# Patient Record
Sex: MALE | Race: WHITE | NOT HISPANIC OR LATINO | Employment: FULL TIME | URBAN - NONMETROPOLITAN AREA
[De-identification: names, ages, dates, MRNs, and addresses within clinical notes are randomized per-mention and may not be internally consistent; named-entity substitution may affect disease eponyms.]

---

## 2017-02-06 ENCOUNTER — OFFICE VISIT (OUTPATIENT)
Dept: CHIROPRACTIC MEDICINE | Facility: OTHER | Age: 52
End: 2017-02-06
Attending: CHIROPRACTOR
Payer: COMMERCIAL

## 2017-02-06 DIAGNOSIS — M54.50 ACUTE BILATERAL LOW BACK PAIN WITHOUT SCIATICA: ICD-10-CM

## 2017-02-06 DIAGNOSIS — M99.02 SEGMENTAL AND SOMATIC DYSFUNCTION OF THORACIC REGION: ICD-10-CM

## 2017-02-06 DIAGNOSIS — M99.03 SEGMENTAL AND SOMATIC DYSFUNCTION OF LUMBAR REGION: Primary | ICD-10-CM

## 2017-02-06 PROCEDURE — 98940 CHIROPRACT MANJ 1-2 REGIONS: CPT | Performed by: CHIROPRACTOR

## 2017-02-07 NOTE — PROGRESS NOTES
Subjective Finding:    Chief compalint: Patient presents with:  Back Pain: stiffness   , Pain Scale: 6/10, Intensity: sharp, Duration: 2 days, Radiating: no.    Date of injury:     Activities that the pain restricts:   Home/household/hobbies/social activities: yes.  Work duties: no.  Sleep: no.  Makes symptoms better: rest.  Makes symptoms worse: activity and twisting.  Have you seen anyone else for the symptoms? No.  Work related: no.  Automobile related injury: no.    Objective and Assessment:    Posture Analysis:   High shoulder: .  Head tilt: .  High iliac crest: .  Head carriage: neutral.  Thoracic Kyphosis: neutral.  Lumbar Lordosis: forward.    Lumbar Range of Motion: extension decreased, left lateral flexion decreased and right lateral flexion decreased.  Cervical Range of Motion: .  Thoracic Range of Motion: .  Extremity Range of Motion: .    Palpation:   Lumbar paraspinals bilateral stiffness    Segmental dysfunction pre-treatment and treatment area: T9, L4 and L5.    Assessment post-treatment:  Cervical: .  Thoracic: ROM increased.  Lumbar: ROM increased and pain and tenderness decreased.    Comments: recovering from shoulder surgery.      Complicating Factors: .    Plan / Procedure:    Treatment plan: PRN.  Instructed patient: ice 20 minutes every other hour as needed.  Short term goals: increase ROM.  Long term goals: increase ADL.  Prognosis: excellent.

## 2017-07-17 DIAGNOSIS — M10.9 GOUT, UNSPECIFIED CAUSE, UNSPECIFIED CHRONICITY, UNSPECIFIED SITE: Primary | ICD-10-CM

## 2017-07-17 DIAGNOSIS — M10.9 ACUTE GOUTY ARTHRITIS: ICD-10-CM

## 2017-07-19 RX ORDER — FEBUXOSTAT 40 MG/1
TABLET ORAL
Qty: 30 TABLET | Refills: 0 | Status: SHIPPED | OUTPATIENT
Start: 2017-07-19 | End: 2017-09-18

## 2017-07-19 NOTE — TELEPHONE ENCOUNTER
[facility]  Last office visit: 8/23/16  Last refill: 8/23/16 #30, 11 R.  Last labs: 12/16/15.  Medication pended.  Please advise.  Thank you.

## 2017-08-01 ENCOUNTER — OFFICE VISIT (OUTPATIENT)
Dept: FAMILY MEDICINE | Facility: OTHER | Age: 52
End: 2017-08-01
Attending: FAMILY MEDICINE
Payer: COMMERCIAL

## 2017-08-01 ENCOUNTER — TELEPHONE (OUTPATIENT)
Dept: FAMILY MEDICINE | Facility: OTHER | Age: 52
End: 2017-08-01

## 2017-08-01 VITALS
BODY MASS INDEX: 26.99 KG/M2 | SYSTOLIC BLOOD PRESSURE: 120 MMHG | DIASTOLIC BLOOD PRESSURE: 78 MMHG | OXYGEN SATURATION: 95 % | TEMPERATURE: 98.1 F | WEIGHT: 192.8 LBS | HEART RATE: 72 BPM | HEIGHT: 71 IN

## 2017-08-01 DIAGNOSIS — H61.21 IMPACTED CERUMEN OF RIGHT EAR: Primary | ICD-10-CM

## 2017-08-01 PROCEDURE — 99213 OFFICE O/P EST LOW 20 MIN: CPT | Performed by: FAMILY MEDICINE

## 2017-08-01 ASSESSMENT — ANXIETY QUESTIONNAIRES
7. FEELING AFRAID AS IF SOMETHING AWFUL MIGHT HAPPEN: NOT AT ALL
3. WORRYING TOO MUCH ABOUT DIFFERENT THINGS: NOT AT ALL
6. BECOMING EASILY ANNOYED OR IRRITABLE: NOT AT ALL
GAD7 TOTAL SCORE: 0
4. TROUBLE RELAXING: NOT AT ALL
2. NOT BEING ABLE TO STOP OR CONTROL WORRYING: NOT AT ALL
5. BEING SO RESTLESS THAT IT IS HARD TO SIT STILL: NOT AT ALL
1. FEELING NERVOUS, ANXIOUS, OR ON EDGE: NOT AT ALL

## 2017-08-01 ASSESSMENT — PAIN SCALES - GENERAL: PAINLEVEL: NO PAIN (0)

## 2017-08-01 NOTE — PROGRESS NOTES
"  SUBJECTIVE:                                                    Patrick Hamlin is a 51 year old male who presents to clinic today for the following health issues:      Ear Problem      Duration: 1 week    Description (location/character/radiation): Green \"gunk\" coming from Right ear after swimming in pool.    Intensity:  mild    Accompanying signs and symptoms: muffled hearing. Can feel some pressure/moisture.     History (similar episodes/previous evaluation): None    Precipitating or alleviating factors: None    Therapies tried and outcome: None    Has been swimming more     Plugged and pressure - no real pain              Problem list and histories reviewed & adjusted, as indicated.  Additional history: as documented        Reviewed and updated as needed this visit by clinical staffTobacco  Allergies  Meds  Soc Hx      Reviewed and updated as needed this visit by Provider         ROS:  C: NEGATIVE for fever, chills, change in weight  E/M: NEGATIVE for ear, mouth and throat problems    OBJECTIVE:                                                    /78 (BP Location: Right arm, Patient Position: Chair, Cuff Size: Adult Regular)  Pulse 72  Temp 98.1  F (36.7  C) (Tympanic)  Ht 5' 11\" (1.803 m)  Wt 192 lb 12.8 oz (87.5 kg)  SpO2 95%  BMI 26.89 kg/m2  Body mass index is 26.89 kg/(m^2).   GENERAL: healthy, alert, well nourished, well hydrated, no distress  HENT: ear canals- normal; TMs- normal but 90% plugged in right ear ; Nose- normal; Mouth- no ulcers, no lesions  NECK: no tenderness, no adenopathy, no asymmetry, no masses, no stiffness; thyroid- normal to palpation         ASSESSMENT/PLAN:                                                      (H61.21) Impacted cerumen of right ear  (primary encounter diagnosis)  Comment: no s/s of Otitis externa   Plan: REMOVE IMPACTED CERUMEN        Ear wash done with good results and sx resolved. Symptomatic treatment was discussed along when patient should call and/or " come back into the clinic or go to ER/Urgent care. All questions answered.           Sabino Beckford MD  [facility]

## 2017-08-01 NOTE — MR AVS SNAPSHOT
"              After Visit Summary   8/1/2017    Patrick Hamlin    MRN: [id number]           Patient Information     Date Of Birth          1965        Visit Information        Provider Department      8/1/2017 3:30 PM Sabino Beckford MD [facility]        Today's Diagnoses     Impacted cerumen of right ear    -  1       Follow-ups after your visit        Who to contact     If you have questions or need follow up information about today's clinic visit or your schedule please contact [facility] directly at [phone].  Normal or non-critical lab and imaging results will be communicated to you by Trefishart, letter or phone within 4 business days after the clinic has received the results. If you do not hear from us within 7 days, please contact the clinic through hiket or phone. If you have a critical or abnormal lab result, we will notify you by phone as soon as possible.  Submit refill requests through Twones or call your pharmacy and they will forward the refill request to us. Please allow 3 business days for your refill to be completed.          Additional Information About Your Visit        MyChart Information     Twones gives you secure access to your electronic health record. If you see a primary care provider, you can also send messages to your care team and make appointments. If you have questions, please call your primary care clinic.  If you do not have a primary care provider, please call [phone] and they will assist you.        Care EveryWhere ID     This is your Care EveryWhere ID. This could be used by other organizations to access your [place] medical records  [id number]        Your Vitals Were     Pulse Temperature Height Pulse Oximetry BMI (Body Mass Index)       72 98.1  F (36.7  C) (Tympanic) 5' 11\" (1.803 m) 95% 26.89 kg/m2        Blood Pressure from Last 3 Encounters:   08/01/17 120/78   08/25/16 120/78   08/23/16 121/73    Weight from Last 3 Encounters: "   08/01/17 192 lb 12.8 oz (87.5 kg)   08/25/16 195 lb (88.5 kg)   08/23/16 195 lb (88.5 kg)              We Performed the Following     REMOVE IMPACTED CERUMEN        Primary Care Provider Office Phone # Fax #    Sabino Beckford [phone]       [facility] [address] [facility] [address]        Equal Access to Services     ANTHONY WISDOM : Hadii aad ku hadasho Soomaali, waaxda luqadaha, qaybta kaalmada adeegyada, waxay idiin hayaan adeeg kharash lalucila . So [facility] [phone].    ATENCIÓN: Si habla español, tiene a hudson disposición servicios gratuitos de asistencia lingüística. Llame al [phone].    We comply with applicable federal civil rights laws and [place] laws. We do not discriminate on the basis of race, color, national origin, age, disability sex, sexual orientation or gender identity.            Thank you!     Thank you for choosing [facility]  for your care. Our goal is always to provide you with excellent care. Hearing back from our patients is one way we can continue to improve our services. Please take a few minutes to complete the written survey that you may receive in the mail after your visit with us. Thank you!             Your Updated Medication List - Protect others around you: Learn how to safely use, store and throw away your medicines at www.disposemymeds.org.          This list is accurate as of: 8/1/17  4:11 PM.  Always use your most recent med list.                   Brand Name Dispense Instructions for use Diagnosis    ASPIRIN PO      Take 81 mg by mouth daily        famotidine 10 MG tablet    PEPCID     Take 10 mg by mouth daily        indomethacin 25 MG capsule    INDOCIN    42 capsule    TAKE 2 CAPSULES BY MOUTH 2 TIMES DAILY WITH MEALS    Gout       ULORIC 40 MG Tabs tablet   Generic drug:  febuxostat     30 tablet    TAKE 1 TABLET BY MOUTH DAILY    Gout, unspecified cause, unspecified chronicity, unspecified site

## 2017-08-01 NOTE — TELEPHONE ENCOUNTER
"11:32 AM    Reason for Call: OVERBOOK    Patient is having the following symptoms: green \"gunk\" coming from Right ear, poss infection    The patient is requesting an appointment for sooner than first avail with dr Beckford.    Was an appointment offered for this call? No    Preferred method for responding to this message: Telephone Call    If we cannot reach you directly, may we leave a detailed response at the number you provided? Yes    Can this message wait until your PCP/provider returns, if unavailable today? Not applicable, provider in    Clare Conde      "

## 2017-08-01 NOTE — NURSING NOTE
"Chief Complaint   Patient presents with     Ear Problem       Initial /78 (BP Location: Right arm, Patient Position: Chair, Cuff Size: Adult Regular)  Pulse 72  Temp 98.1  F (36.7  C) (Tympanic)  Ht 5' 11\" (1.803 m)  Wt 192 lb 12.8 oz (87.5 kg)  SpO2 95%  BMI 26.89 kg/m2 Estimated body mass index is 26.89 kg/(m^2) as calculated from the following:    Height as of this encounter: 5' 11\" (1.803 m).    Weight as of this encounter: 192 lb 12.8 oz (87.5 kg).  Medication Reconciliation: complete     Niki Chun     "

## 2017-08-02 ASSESSMENT — PATIENT HEALTH QUESTIONNAIRE - PHQ9: SUM OF ALL RESPONSES TO PHQ QUESTIONS 1-9: 0

## 2017-08-02 ASSESSMENT — ANXIETY QUESTIONNAIRES: GAD7 TOTAL SCORE: 0

## 2017-09-18 DIAGNOSIS — M10.9 GOUT, UNSPECIFIED CAUSE, UNSPECIFIED CHRONICITY, UNSPECIFIED SITE: ICD-10-CM

## 2017-09-18 RX ORDER — FEBUXOSTAT 40 MG/1
TABLET ORAL
Qty: 30 TABLET | Refills: 1 | Status: SHIPPED | OUTPATIENT
Start: 2017-09-18 | End: 2017-11-15

## 2017-09-18 NOTE — TELEPHONE ENCOUNTER
[facility]        Last Written Prescription Date: 7/19/2017  Last Fill Quantity: 30, # refills: 0  [facility] Office [facility] with [email or web address] or [facility] prescribing provider:  8/01/2017        Uric Acid   Date Value Ref Range Status   12/16/2015 6.7 3.5 - 7.2 mg/dL Final   ]  Creatinine   Date Value Ref Range Status   12/16/2015 1.15 0.66 - 1.25 mg/dL Final   ]  Lab Results   Component Value Date    WBC 6.0 04/01/2016     Lab Results   Component Value Date    RBC 5.19 04/01/2016     Lab Results   Component Value Date    HGB 15.9 04/01/2016     Lab Results   Component Value Date    HCT 46.5 04/01/2016     No components found for: MCT  Lab Results   Component Value Date    MCV 90 04/01/2016     Lab Results   Component Value Date    MCH 30.6 04/01/2016     Lab Results   Component Value Date    MCHC 34.2 04/01/2016     Lab Results   Component Value Date    RDW 12.8 04/01/2016     Lab Results   Component Value Date     04/01/2016     No results found for: AST  No results found for: ALT

## 2017-11-15 DIAGNOSIS — M10.9 GOUT, UNSPECIFIED CAUSE, UNSPECIFIED CHRONICITY, UNSPECIFIED SITE: ICD-10-CM

## 2017-11-16 NOTE — TELEPHONE ENCOUNTER
[facility]       Last Written Prescription Date: 9/18/2017  Last Fill Quantity: 30,  # refills: 1   Last Office Visit with [facility], [facility] or [facility] prescribing provider: 8/01/2017

## 2017-11-17 RX ORDER — FEBUXOSTAT 40 MG/1
TABLET ORAL
Qty: 30 TABLET | Refills: 5 | Status: SHIPPED | OUTPATIENT
Start: 2017-11-17 | End: 2018-04-16

## 2018-04-16 DIAGNOSIS — M10.9 GOUT, UNSPECIFIED CAUSE, UNSPECIFIED CHRONICITY, UNSPECIFIED SITE: ICD-10-CM

## 2018-04-17 RX ORDER — FEBUXOSTAT 40 MG/1
TABLET ORAL
Qty: 30 TABLET | Refills: 5 | Status: SHIPPED | OUTPATIENT
Start: 2018-04-17 | End: 2018-11-13

## 2018-04-17 NOTE — TELEPHONE ENCOUNTER
Please advise on [facility].  Patient due for labs.  Last office visit: 8/1/17  Last signed: 11/17/17 #30, 5 R  Thank you.

## 2018-11-29 NOTE — PATIENT INSTRUCTIONS
Preventive Health Recommendations  Male Ages 50 - 64    Yearly exam:             See your health care provider every year in order to  o   Review health changes.   o   Discuss preventive care.    o   Review your medicines if your doctor has prescribed any.     Have a cholesterol test every 5 years, or more frequently if you are at risk for high cholesterol/heart disease.     Have a diabetes test (fasting glucose) every three years. If you are at risk for diabetes, you should have this test more often.     Have a colonoscopy at age 50, or have a yearly FIT test (stool test). These exams will check for colon cancer.      Talk with your health care provider about whether or not a prostate cancer screening test (PSA) is right for you.    You should be tested each year for STDs (sexually transmitted diseases), if you re at risk.     Shots: Get a flu shot each year. Get a tetanus shot every 10 years.     Nutrition:    Eat at least 5 servings of fruits and vegetables daily.     Eat whole-grain bread, whole-wheat pasta and brown rice instead of white grains and rice.     Get adequate Calcium and Vitamin D.     Lifestyle    Exercise for at least 150 minutes a week (30 minutes a day, 5 days a week). This will help you control your weight and prevent disease.     Limit alcohol to one drink per day.     No smoking.     Wear sunscreen to prevent skin cancer.     See your dentist every six months for an exam and cleaning.     See your eye doctor every 1 to 2 years.   Lipid Profile (Chol, Trig, HDL, LDL calc)   Status:  Final result   Visible to patient:  Yes (MyChart) Dx:  FH: prostate cancer; FH: colon cancer... Order: [id number]       Notes Recorded by Sabino Beckford MD on 12/7/2018 at 8:49 AM  No notes recorded by provider        Ref Range & Units 7:51 AM       Cholesterol <200 mg/dL 219 (H)     Comments: Desirable:       <200 mg/dl     Triglycerides <150 mg/dL 208 (H)     Comments: Borderline high:  150-199 mg/dl   High:              200-499 mg/dl   Very high:       >499 mg/dl   Fasting specimen        HDL Cholesterol >39 mg/dL 43     LDL Cholesterol Calculated <100 mg/dL 134 (H)     Comments: Above desirable:  100-129 mg/dl   Borderline High:  130-159 mg/dL   High:             160-189 mg/dL   Very high:       >189 mg/dl        Non HDL Cholesterol <130 mg/dL 176 (H)     Comments: Above Desirable:  130-159 mg/dl   Borderline high:  160-189 mg/dl   High:             190-219 mg/dl   Very high:       >219 mg/dl        [facility]ing Agency  [facility]          Specimen Collected: 12/07/18  7:51 AM Last Resulted: 12/07/18  8:18 AM                                Comprehensive metabolic panel   Status:  Final result   Visible to patient:  Yes (MyChart) Dx:  FH: prostate cancer; FH: colon cancer... Order: [id number]       Notes Recorded by Sabino Beckford MD on 12/7/2018 at 8:49 AM  No notes recorded by provider           Ref Range & Units 7:51 AM   2yr ago        Sodium 133 - 144 mmol/L 142 142      Potassium 3.4 - 5.3 mmol/L 4.3 4.2      Chloride 94 - 109 mmol/L 108 108      Carbon Dioxide 20 - 32 mmol/L 26 27      Anion Gap 3 - 14 mmol/L 8 7      Glucose 70 - 99 mg/dL 110 (H) 87     Comments: Fasting specimen      Urea Nitrogen 7 - 30 mg/dL 18 19      Creatinine 0.66 - 1.25 mg/dL 1.03 1.15      GFR Estimate >60 mL/min/1.7m2 76 68CM     Comments: Non  GFR Calc      GFR Estimate If Black >60 mL/min/1.7m2 >90 82CM     Comments:  GFR Calc      Calcium 8.5 - 10.1 mg/dL 9.1 8.5      Bilirubin Total 0.2 - 1.3 mg/dL 0.9       Albumin 3.4 - 5.0 g/dL 4.0       Protein Total 6.8 - 8.8 g/dL 7.9       Alkaline Phosphatase 40 - 150 U/L 73       ALT 0 - 70 U/L 74 (H)       AST 0 - 45 U/L 30      [facility]          Specimen Collected: 12/07/18  7:51 AM Last Resulted: 12/07/18  8:18 AM                CM=Additional comments                      PSA Diagnostic   Status:  Final result   Visible to patient:  Yes (MyChart) Dx:   FH: prostate cancer; FH: colon cancer... Order: [id number]       Notes Recorded by Sabino Beckford MD on 12/7/2018 at 8:49 AM  No notes recorded by provider        Ref Range & Units 7:51 AM       PSA 0 - 4 ug/L 0.38     Comments: Assay Method:  Chemiluminescence using Siemens Vista analyzer     Resulting Agency  [facility]          Specimen Collected: 12/07/18  7:51 AM Last Resulted: 12/07/18  8:18 AM                                 CBC with platelets and differential   Status:  Final result   Visible to patient:  Yes (MyChart) Dx:  FH: prostate cancer; FH: colon cancer... Order: [id number]       Notes Recorded by Sabino Beckford MD on 12/7/2018 at 8:49 AM  No notes recorded by provider           Ref Range & Units 7:51 AM  (12/7/18) 2yr ago  (4/1/16) 2yr ago  (12/16/15)      WBC 4.0 - 11.0 10e9/L 5.5 6.0 9.4      RBC Count 4.4 - 5.9 10e12/L 5.43 5.19 4.97      Hemoglobin 13.3 - 17.7 g/dL 16.9 15.9 15.3      Hematocrit 40.0 - 53.0 % 48.1 46.5 44.2      MCV 78 - 100 fl 89 90 89      MCH 26.5 - 33.0 pg 31.1 30.6 30.8      MCHC 31.5 - 36.5 g/dL 35.1 34.2 34.6      RDW 10.0 - 15.0 % 12.3 12.8 13.0      Platelet Count 150 - 450 10e9/L 276 265 259      Diff Method  Automated Method Automated Method Automated Method      % Neutrophils % 51.7 51.8 70.4      % Lymphocytes % 29.7 29.3 16.4      % Monocytes % 10.8 12.7 9.8      % Eosinophils % 6.7 4.7 2.8      % Basophils % 0.7 1.0 0.4      % Immature Granulocytes % 0.4 0.5 0.2      Nucleated RBCs 0 /100 0 0       Absolute Neutrophil 1.6 - 8.3 10e9/L 2.9 3.1 6.6      Absolute Lymphocytes 0.8 - 5.3 10e9/L 1.6 1.8 1.5      Absolute Monocytes 0.0 - 1.3 10e9/L 0.6 0.8 0.9      Absolute Eosinophils 0.0 - 0.7 10e9/L 0.4 0.3 0.3      Absolute Basophils 0.0 - 0.2 10e9/L 0.0 0.1 0.0      Abs Immature Granulocytes 0 - 0.4 10e9/L 0.0 0.0 0.0      Absolute Nucleated RBC  0.0 0.0      [facility]          Specimen Collected: 12/07/18  7:51 AM Last Resulted: 12/07/18  7:58 AM                                  Status of Other Orders        Order    Lab Status Result Date Provider Status       EKG 12-lead complete w/read - Clinics No Result  Ordered
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
## 2018-12-07 ENCOUNTER — OFFICE VISIT (OUTPATIENT)
Dept: FAMILY MEDICINE | Facility: OTHER | Age: 53
End: 2018-12-07
Attending: FAMILY MEDICINE
Payer: COMMERCIAL

## 2018-12-07 VITALS
HEART RATE: 64 BPM | HEIGHT: 72 IN | TEMPERATURE: 97.3 F | SYSTOLIC BLOOD PRESSURE: 112 MMHG | OXYGEN SATURATION: 98 % | BODY MASS INDEX: 25.73 KG/M2 | WEIGHT: 190 LBS | DIASTOLIC BLOOD PRESSURE: 80 MMHG

## 2018-12-07 DIAGNOSIS — Z00.00 ROUTINE HISTORY AND PHYSICAL EXAMINATION OF ADULT: ICD-10-CM

## 2018-12-07 DIAGNOSIS — M10.9 GOUT, UNSPECIFIED CAUSE, UNSPECIFIED CHRONICITY, UNSPECIFIED SITE: ICD-10-CM

## 2018-12-07 DIAGNOSIS — Z80.42 FH: PROSTATE CANCER: ICD-10-CM

## 2018-12-07 DIAGNOSIS — L30.9 ECZEMA, UNSPECIFIED TYPE: ICD-10-CM

## 2018-12-07 DIAGNOSIS — R73.9 ELEVATED BLOOD SUGAR: ICD-10-CM

## 2018-12-07 DIAGNOSIS — Z00.00 ROUTINE HISTORY AND PHYSICAL EXAMINATION OF ADULT: Primary | ICD-10-CM

## 2018-12-07 DIAGNOSIS — Z80.0 FH: COLON CANCER: ICD-10-CM

## 2018-12-07 DIAGNOSIS — E78.2 MIXED HYPERLIPIDEMIA: ICD-10-CM

## 2018-12-07 LAB
ALBUMIN SERPL-MCNC: 4 G/DL (ref 3.4–5)
ALP SERPL-CCNC: 73 U/L (ref 40–150)
ALT SERPL W P-5'-P-CCNC: 74 U/L (ref 0–70)
ANION GAP SERPL CALCULATED.3IONS-SCNC: 8 MMOL/L (ref 3–14)
AST SERPL W P-5'-P-CCNC: 30 U/L (ref 0–45)
BASOPHILS # BLD AUTO: 0 10E9/L (ref 0–0.2)
BASOPHILS NFR BLD AUTO: 0.7 %
BILIRUB SERPL-MCNC: 0.9 MG/DL (ref 0.2–1.3)
BUN SERPL-MCNC: 18 MG/DL (ref 7–30)
CALCIUM SERPL-MCNC: 9.1 MG/DL (ref 8.5–10.1)
CHLORIDE SERPL-SCNC: 108 MMOL/L (ref 94–109)
CHOLEST SERPL-MCNC: 219 MG/DL
CO2 SERPL-SCNC: 26 MMOL/L (ref 20–32)
CREAT SERPL-MCNC: 1.03 MG/DL (ref 0.66–1.25)
DIFFERENTIAL METHOD BLD: NORMAL
EOSINOPHIL # BLD AUTO: 0.4 10E9/L (ref 0–0.7)
EOSINOPHIL NFR BLD AUTO: 6.7 %
ERYTHROCYTE [DISTWIDTH] IN BLOOD BY AUTOMATED COUNT: 12.3 % (ref 10–15)
GFR SERPL CREATININE-BSD FRML MDRD: 76 ML/MIN/1.7M2
GLUCOSE SERPL-MCNC: 110 MG/DL (ref 70–99)
HCT VFR BLD AUTO: 48.1 % (ref 40–53)
HDLC SERPL-MCNC: 43 MG/DL
HGB BLD-MCNC: 16.9 G/DL (ref 13.3–17.7)
IMM GRANULOCYTES # BLD: 0 10E9/L (ref 0–0.4)
IMM GRANULOCYTES NFR BLD: 0.4 %
LDLC SERPL CALC-MCNC: 134 MG/DL
LYMPHOCYTES # BLD AUTO: 1.6 10E9/L (ref 0.8–5.3)
LYMPHOCYTES NFR BLD AUTO: 29.7 %
MCH RBC QN AUTO: 31.1 PG (ref 26.5–33)
MCHC RBC AUTO-ENTMCNC: 35.1 G/DL (ref 31.5–36.5)
MCV RBC AUTO: 89 FL (ref 78–100)
MONOCYTES # BLD AUTO: 0.6 10E9/L (ref 0–1.3)
MONOCYTES NFR BLD AUTO: 10.8 %
NEUTROPHILS # BLD AUTO: 2.9 10E9/L (ref 1.6–8.3)
NEUTROPHILS NFR BLD AUTO: 51.7 %
NONHDLC SERPL-MCNC: 176 MG/DL
NRBC # BLD AUTO: 0 10*3/UL
NRBC BLD AUTO-RTO: 0 /100
PLATELET # BLD AUTO: 276 10E9/L (ref 150–450)
POTASSIUM SERPL-SCNC: 4.3 MMOL/L (ref 3.4–5.3)
PROT SERPL-MCNC: 7.9 G/DL (ref 6.8–8.8)
PSA SERPL-MCNC: 0.38 UG/L (ref 0–4)
RBC # BLD AUTO: 5.43 10E12/L (ref 4.4–5.9)
SODIUM SERPL-SCNC: 142 MMOL/L (ref 133–144)
TRIGL SERPL-MCNC: 208 MG/DL
WBC # BLD AUTO: 5.5 10E9/L (ref 4–11)

## 2018-12-07 PROCEDURE — 93000 ELECTROCARDIOGRAM COMPLETE: CPT | Performed by: INTERNAL MEDICINE

## 2018-12-07 PROCEDURE — 99396 PREV VISIT EST AGE 40-64: CPT | Performed by: FAMILY MEDICINE

## 2018-12-07 PROCEDURE — 85025 COMPLETE CBC W/AUTO DIFF WBC: CPT | Performed by: FAMILY MEDICINE

## 2018-12-07 PROCEDURE — 80061 LIPID PANEL: CPT | Performed by: FAMILY MEDICINE

## 2018-12-07 PROCEDURE — 84153 ASSAY OF PSA TOTAL: CPT | Performed by: FAMILY MEDICINE

## 2018-12-07 PROCEDURE — 80053 COMPREHEN METABOLIC PANEL: CPT | Performed by: FAMILY MEDICINE

## 2018-12-07 PROCEDURE — 36415 COLL VENOUS BLD VENIPUNCTURE: CPT | Performed by: FAMILY MEDICINE

## 2018-12-07 RX ORDER — FEBUXOSTAT 40 MG/1
40 TABLET, FILM COATED ORAL DAILY
Qty: 90 TABLET | Refills: 3 | Status: SHIPPED | OUTPATIENT
Start: 2018-12-07 | End: 2019-12-09

## 2018-12-07 ASSESSMENT — PAIN SCALES - GENERAL: PAINLEVEL: NO PAIN (0)

## 2018-12-07 ASSESSMENT — ANXIETY QUESTIONNAIRES
GAD7 TOTAL SCORE: 0
3. WORRYING TOO MUCH ABOUT DIFFERENT THINGS: NOT AT ALL
7. FEELING AFRAID AS IF SOMETHING AWFUL MIGHT HAPPEN: NOT AT ALL
2. NOT BEING ABLE TO STOP OR CONTROL WORRYING: NOT AT ALL
4. TROUBLE RELAXING: NOT AT ALL
5. BEING SO RESTLESS THAT IT IS HARD TO SIT STILL: NOT AT ALL
1. FEELING NERVOUS, ANXIOUS, OR ON EDGE: NOT AT ALL
6. BECOMING EASILY ANNOYED OR IRRITABLE: NOT AT ALL

## 2018-12-07 ASSESSMENT — PATIENT HEALTH QUESTIONNAIRE - PHQ9: SUM OF ALL RESPONSES TO PHQ QUESTIONS 1-9: 0

## 2018-12-07 NOTE — NURSING NOTE
Chief Complaint   Patient presents with     Physical       Initial /80  Pulse 64  Temp 97.3  F (36.3  C)  Ht 6' (1.829 m)  Wt 190 lb (86.2 kg)  SpO2 98%  BMI 25.77 kg/m2 Estimated body mass index is 25.77 kg/(m^2) as calculated from the following:    Height as of this encounter: 6' (1.829 m).    Weight as of this encounter: 190 lb (86.2 kg).  Medication Reconciliation: complete    Santos Julio LPN

## 2018-12-08 ASSESSMENT — ANXIETY QUESTIONNAIRES: GAD7 TOTAL SCORE: 0

## 2018-12-27 ENCOUNTER — TELEPHONE (OUTPATIENT)
Dept: SURGERY | Facility: OTHER | Age: 53
End: 2018-12-27

## 2018-12-27 DIAGNOSIS — Z12.11 SCREENING FOR MALIGNANT NEOPLASM OF COLON: Primary | ICD-10-CM

## 2018-12-27 NOTE — TELEPHONE ENCOUNTER
Referral received for meet and greet colonoscopy. Patient scheduled for colonoscopy on 2/8/19 with Dr. [place] at [facility] with Gatorade prep. Instructions given via phone and instructions mailed to patient with surgery handbook. .    Indiana Gale LPN

## 2018-12-27 NOTE — LETTER
December 27, 2018          Patrick Hamlin  [address]   [facility] [address]          Dear Patrick,     GUIDE TO YOUR COLONOSCOPY WITH GATORADE PREP  At [facility], we want to make sure that your procedure is as pleasant as possible. This guide is designed to answer any questions you might have and to walk you through the preparations you will need to make before your procedure. Should you have additional questions, please feel free to contact us. Contact numbers are listed below. Thank you for choosing [facility].    Clinic Health Unit Coordinator: [phone]  Clinic Nurse: [phone]  Surgery Education Nurse: [phone]    Date of Procedure: 2/8/19 with Dr. Canada   Admit time: Surgery Department will call you the day before your procedure by 5pm with your arrival time. If your surgery is on Monday, expect a call on Friday. If we were unable to reach you before 5PM, you may call admitting at [phone].    All questions or concerns can be directed to the clinic or surgery education nurse. If you have a scheduling or appointment question, or need to postpone your procedure, please call the Health Unit Coordinator between 8am and 4pm Monday through Friday. After hours or on weekends, please call [phone] to postpone.     Call the surgery nurse or your primary care provider if you should become ill within 1-2 weeks of your procedure and we will reschedule it when you are healthy. This includes signs or symptoms of a cold or the flu, fever, chills, sore throat, cough, chest congestion, productive cough, runny nose.     COLONOSCOPY PREP    7 DAYS BEFORE THE EXAM:  Do not take Aspirin (325mg) or other NSAIDS (Ibuprofen, Motrin, Aleve, Celebrex, Naproxen, etc) 7 days before your surgery. Tylenol is fine. Stop taking fiber supplements, vitamins, and iron. Stop eating corn, nuts and seeds.  If you are prescribed blood thinners (Aspirin, Coumadin/Warfarin, Plavix, etc) talk to your provider.  "If you are a diabetic and take medications to control your blood sugar, follow the special instructions listed or talk to your provider.     Please call the Surgery Education Nurse at [phone] 1-2 weeks before your procedure and have an allergy and medication list ready     Arrange transportation with a family member or friend to drive you home and have an adult available to stay with you for the next 4 hours when you arrive home for your safety. If you need to take a taxi or the bus, you MUST have a responsible adult to ride with you OR YOUR PROCEDURE WILL BE CANCELLED.    You have been ordered Gatorade Prep as your mechanical bowel prep. This are all over the counter and does not require a prescription. Please purchase the following items:    Two 5 mg Dulcolax (bisacodyl) tablets   One 8.3 ounce bottle of miralax   One 10 ounce bottle of magnesium citrate   One 64 ounce bottle of gatorade-No red or purple, not powdered.    2 DAYS BEFORE THE EXAM:   Begin a low fiber diet. No raw fruits and vegetables or whole grains.  Please see the list of foods you can have and foods to avoid on page 3 of the separate \"Miralax, Dulcolax and Magnesium Citrate\" colonoscopy instruction packet. Drink at least 4-6 large glasses of sports drink each day (not red or purple).    1 DAY BEFORE THE EXAM:  DO NOT EAT ANY SOLID FOOD OR MILK PRODUCTS AFTER 12:00 AM (MIDNIGHT). Drink only clear liquids all day, at least 8-10 glasses. Please see the list of liquids you can have and what to avoid on page 2 of the separate \"Miralax, Dulcolax and Magnesium Citrate\" colonoscopy instruction packet.  No red or purple colors, milk products, or alcohol.     AT 12:00 PM NOON THE DAY BEFORE EXAM:  Take 2 Dulcolax tablets by mouth with clear liquids.    AT 6:00 PM THE DAY BEFORE EXAM:  Mix the bottle of Miralax and 64 ounces of Gatorade in a pitcher. Drink one 8 ounce glass every 15 minutes until gone. Stay near a toilet. You will have watery stools " "and may have cramping, bloating and nausea.    DAY OF COLONOSCOPY EXAM:   6 HOURS PRIOR TO EXAM DAY OF PROCEDURE:  Drink the bottle of magnesium citrate. Right after drinking this, drink one full glass of water.    You many have clear liquids up until 3 hours before you check in at admitting.  If you need to take any medications after this, take them with a tiny sip of water. If you have asthma, bring your inhaler with you.    Wear comfortable clothes. No jewelry, body piercings, make-up, nail polish, hair spray, lotions, perfumes or colognes. Shower before you arrive. [place] in Admitting through the [place] Entr[facility]. You must have a  with you and and adult available to stay with your for 4 hours at home. The medicine used in this test will make you sleepy. If you do not have someone, please reschedule or your test will be cancelled.  It is recommended that you do not drive for 24 hours after your test. Do not operate power equipment, drink alcohol, make important decisions or sign legal documents.     COLONOSCOPY FREQUENTLY ASKED QUESTIONS  What is a colonoscopy?    A colonoscopy is a test to look at the lining of your large intestine. The purpose of the exam is to check for abnormalities including growths called \"polyps\" that can lead to serious disease. A flexible scope is inserted into your rectum by the doctor to examine your large intestine.    What are polyps?  Polyps are abnormal growths on the lining of the colon. Most polyps are not cancerous, but some polyps have the potential to turn into cancer with time. Polyps can also bleed. For these reasons, most polyps are removed during a colonoscopy and sent to the laboratory for microscopic examination.    What preparation is needed?  The colon must be completely clean for the procedure to be performed. You may be given one or two different prep solutions to cleanse your bowel. You will also need to follow a clear liquid diet the day before your " procedure.    What happens after the procedure?  After your procedure is complete, you will be taken back to your day surgery room where you will be monitored for approximately 1 hour. You can expect to feel drowsy for several hours afterward. You may experience some cramping or bloating due to the air introduced into your colon during the exam. You will not be able to drive or operate machinery the rest of the day. You will be given written discharge instructions and appropriate learning material before you go home. You must have an adult to stay at home with you for the next 4 hours after you leave the hospital for your safety.    When will I find out the results of my test?  Your surgeon will talk to you and your designated  before you leave and usually the preliminary results can be given to you at that time. If a biopsy was taken during your procedure, it will be sent to the laboratory for examination. Results usually take one week. You will be contacted by phone or by letter with results.      TIPS FOR COLON CLEANSING BEFORE YOUR COLONOSCOPY  To get accurate results from your exam, your colon must be completely clean and empty. Please follow your doctor's instructions. If you do not, you may need to repeat both the exam and colon-cleansing process.    The medicine you take may cause bloating, nausea and other discomfort. Follow these tips to make the process as easy as possible:     You may use alcohol-free baby wipes to ease anal irritation. You may also use Vaseline to help protect the skin. Other options include Tucks wipes, hemorrhoid treatments and hydrocortisone cream.     You may wish to squeeze some lemon juice into your preparation or add a packet of Crystal Light lemon-lime or ice tea flavor. (remember to not use red or purple)    To chill the solution, put it in your refrigerator or set it in a bowl of ice. Do not add ice in your glass. You may remove the preparation from the refrigerator  15-30 minutes before drinking.    Quickly drink one whole glass every 5 to 10 minutes. It may help to use a timer. If the liquid is too salty, use a straw.    Stay near a toilet! You will have diarrhea (loose watery stools) and may also have chills. Dress for comfort. Expect to feel discomfort until the stool clears from your colon. This usually takes about 2 to 4 hours.    Even when you are sitting on the toilet, keep drinking a glass of solution every 10-15 minutes. If you have nausea or vomiting, rinse your mouth with water. Take a break for 15 to 30 minutes, and then keep drinking the solution.    Some people find it helpful to suck on a wedge of lemon or lime. You may also try sucking on hard candy (not red or purple) or washing your mouth out with water, clear soda or mouthwash.    If you followed your doctor's orders and your stool is a clear or yellow liquid, you are ready for the exam. If you are not sure if your colon is clean, please call your clinic and ask to speak to a nurse.     Thank you for allowing Dr. Canada and our surgical team to participate in your care. Please review the following instructions and information to prepare for your upcoming colonoscopy and feel free to call our office with any questions.       Sincerely,        Lucas Canada MD

## 2019-02-06 DIAGNOSIS — Z12.11 SPECIAL SCREENING FOR MALIGNANT NEOPLASMS, COLON: Primary | ICD-10-CM

## 2019-02-06 RX ORDER — SODIUM, POTASSIUM,MAG SULFATES 17.5-3.13G
2 SOLUTION, RECONSTITUTED, ORAL ORAL ONCE
Qty: 2 BOTTLE | Refills: 0 | Status: SHIPPED | OUTPATIENT
Start: 2019-02-06 | End: 2019-05-06

## 2019-02-07 ENCOUNTER — ANESTHESIA EVENT (OUTPATIENT)
Dept: SURGERY | Facility: HOSPITAL | Age: 54
End: 2019-02-07
Payer: COMMERCIAL
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
## 2019-02-08 ENCOUNTER — ANESTHESIA (OUTPATIENT)
Dept: SURGERY | Facility: HOSPITAL | Age: 54
End: 2019-02-08
Payer: COMMERCIAL

## 2019-02-08 ENCOUNTER — HOSPITAL ENCOUNTER (OUTPATIENT)
Facility: HOSPITAL | Age: 54
Discharge: HOME | End: 2019-02-08
Attending: SURGERY | Admitting: SURGERY
Payer: COMMERCIAL

## 2019-02-08 VITALS
DIASTOLIC BLOOD PRESSURE: 94 MMHG | TEMPERATURE: 96.8 F | RESPIRATION RATE: 16 BRPM | BODY MASS INDEX: 26.14 KG/M2 | OXYGEN SATURATION: 96 % | WEIGHT: 193 LBS | HEIGHT: 72 IN | SYSTOLIC BLOOD PRESSURE: 122 MMHG

## 2019-02-08 PROCEDURE — 36000050 ZZH SURGERY LEVEL 2 1ST 30 MIN: Performed by: SURGERY

## 2019-02-08 PROCEDURE — 45378 DIAGNOSTIC COLONOSCOPY: CPT | Performed by: ANESTHESIOLOGY

## 2019-02-08 PROCEDURE — 71000027 ZZH RECOVERY PHASE 2 EACH 15 MINS: Performed by: SURGERY

## 2019-02-08 PROCEDURE — 01999 UNLISTED ANES PROCEDURE: CPT | Performed by: NURSE ANESTHETIST, CERTIFIED REGISTERED

## 2019-02-08 PROCEDURE — 40000305 ZZH STATISTIC PRE PROC ASSESS I: Performed by: SURGERY

## 2019-02-08 PROCEDURE — 37000008 ZZH ANESTHESIA TECHNICAL FEE, 1ST 30 MIN: Performed by: SURGERY

## 2019-02-08 PROCEDURE — G0105 COLORECTAL SCRN; HI RISK IND: HCPCS | Performed by: SURGERY

## 2019-02-08 PROCEDURE — 25000128 H RX IP 250 OP 636: Performed by: NURSE ANESTHETIST, CERTIFIED REGISTERED

## 2019-02-08 RX ORDER — ONDANSETRON 4 MG/1
4 TABLET, ORALLY DISINTEGRATING ORAL EVERY 30 MIN PRN
Status: DISCONTINUED | OUTPATIENT
Start: 2019-02-08 | End: 2019-02-08 | Stop reason: HOSPADM

## 2019-02-08 RX ORDER — MEPERIDINE HYDROCHLORIDE 50 MG/ML
12.5 INJECTION INTRAMUSCULAR; INTRAVENOUS; SUBCUTANEOUS
Status: DISCONTINUED | OUTPATIENT
Start: 2019-02-08 | End: 2019-02-08 | Stop reason: HOSPADM

## 2019-02-08 RX ORDER — HYDROMORPHONE HYDROCHLORIDE 1 MG/ML
.3-.5 INJECTION, SOLUTION INTRAMUSCULAR; INTRAVENOUS; SUBCUTANEOUS EVERY 10 MIN PRN
Status: DISCONTINUED | OUTPATIENT
Start: 2019-02-08 | End: 2019-02-08 | Stop reason: HOSPADM

## 2019-02-08 RX ORDER — NALOXONE HYDROCHLORIDE 0.4 MG/ML
.1-.4 INJECTION, SOLUTION INTRAMUSCULAR; INTRAVENOUS; SUBCUTANEOUS
Status: DISCONTINUED | OUTPATIENT
Start: 2019-02-08 | End: 2019-02-08 | Stop reason: HOSPADM

## 2019-02-08 RX ORDER — ALBUTEROL SULFATE 0.83 MG/ML
2.5 SOLUTION RESPIRATORY (INHALATION) EVERY 4 HOURS PRN
Status: DISCONTINUED | OUTPATIENT
Start: 2019-02-08 | End: 2019-02-08 | Stop reason: HOSPADM

## 2019-02-08 RX ORDER — ONDANSETRON 2 MG/ML
4 INJECTION INTRAMUSCULAR; INTRAVENOUS EVERY 30 MIN PRN
Status: DISCONTINUED | OUTPATIENT
Start: 2019-02-08 | End: 2019-02-08 | Stop reason: HOSPADM

## 2019-02-08 RX ORDER — LIDOCAINE 40 MG/G
CREAM TOPICAL
Status: DISCONTINUED | OUTPATIENT
Start: 2019-02-08 | End: 2019-02-08 | Stop reason: HOSPADM

## 2019-02-08 RX ORDER — SODIUM CHLORIDE, SODIUM LACTATE, POTASSIUM CHLORIDE, CALCIUM CHLORIDE 600; 310; 30; 20 MG/100ML; MG/100ML; MG/100ML; MG/100ML
INJECTION, SOLUTION INTRAVENOUS CONTINUOUS
Status: DISCONTINUED | OUTPATIENT
Start: 2019-02-08 | End: 2019-02-08 | Stop reason: HOSPADM

## 2019-02-08 RX ORDER — FENTANYL CITRATE 50 UG/ML
25-50 INJECTION, SOLUTION INTRAMUSCULAR; INTRAVENOUS
Status: DISCONTINUED | OUTPATIENT
Start: 2019-02-08 | End: 2019-02-08 | Stop reason: HOSPADM

## 2019-02-08 RX ORDER — HYDRALAZINE HYDROCHLORIDE 20 MG/ML
2.5-5 INJECTION INTRAMUSCULAR; INTRAVENOUS EVERY 10 MIN PRN
Status: DISCONTINUED | OUTPATIENT
Start: 2019-02-08 | End: 2019-02-08 | Stop reason: HOSPADM

## 2019-02-08 RX ORDER — PROPOFOL 10 MG/ML
INJECTION, EMULSION INTRAVENOUS PRN
Status: DISCONTINUED | OUTPATIENT
Start: 2019-02-08 | End: 2019-02-08

## 2019-02-08 RX ADMIN — PROPOFOL 40 MG: 10 INJECTION, EMULSION INTRAVENOUS at 07:48

## 2019-02-08 RX ADMIN — PROPOFOL 40 MG: 10 INJECTION, EMULSION INTRAVENOUS at 07:42

## 2019-02-08 RX ADMIN — PROPOFOL 40 MG: 10 INJECTION, EMULSION INTRAVENOUS at 07:45

## 2019-02-08 RX ADMIN — PROPOFOL 50 MG: 10 INJECTION, EMULSION INTRAVENOUS at 07:40

## 2019-02-08 RX ADMIN — PROPOFOL 50 MG: 10 INJECTION, EMULSION INTRAVENOUS at 07:51

## 2019-02-08 RX ADMIN — PROPOFOL 70 MG: 10 INJECTION, EMULSION INTRAVENOUS at 07:39

## 2019-02-08 RX ADMIN — SODIUM CHLORIDE, POTASSIUM CHLORIDE, SODIUM LACTATE AND CALCIUM CHLORIDE: 600; 310; 30; 20 INJECTION, SOLUTION INTRAVENOUS at 07:29

## 2019-02-08 ASSESSMENT — MIFFLIN-ST. JEOR: SCORE: 1768.44

## 2019-02-08 NOTE — OR NURSING
[facility] Hospital Medicine  History & Physical    Patient Name: Nelly Tian  MRN: [id number]  Admission Date: 9/23/2018  Attending Physician: Geovanny Weinstein MD   Primary Care Provider: Constantine Bird MD         Patient information was obtained from patient, relative(s) and ER records.     Subjective:     Principal Problem:Cellulitis of trunk    Chief Complaint:   Chief Complaint   Patient presents with    Shortness of Breath        HPI: 69 y/o female developed fever today and felt awful. She's had a productive cough, SOB and wheezing for about 3 days. Her daughter noticed that her abdominal wall was erythematous. The pt has been dealing with ulcerations in her pannus since April of last year and was hospitalized last month with cellulitis. She receives tx at the wound care clinic. Dr. Tan is her doctor.    She has well controlled DM.  She has Oxygent dependent COPD and continues to smoke about 4 cigs a day.    She denies nausea, vomiting, abdominal pain, diarrhea or dysuria.    Past Medical History:   Diagnosis Date    *Atrial flutter     Angina pectoris 9/18/2017    Anxiety     Arthritis     Asthma     Atrial fibrillation     Back pain     Cataract     OD    CHF (congestive heart failure)     COPD (chronic obstructive pulmonary disease)     Depression     Diabetes mellitus     Emphysema of lung     Heart failure     Hepatomegaly 2/3/2016    Hernia     History of MI (myocardial infarction) 1/19/2016    Hypercapnic respiratory failure, chronic 11/16/2016    Hyperlipidemia     Hypertension     Iron deficiency anemia 2/3/2016    Myocardial infarction     Obesity     Peripheral vascular disease     Pneumonia     Polyneuropathy     Retinal detachment     OS    Septic shock 4/23/2017    Skin ulcer 3/18/2017    Tobacco dependence     Type II or unspecified type diabetes mellitus with neurological manifestations, not stated as uncontrolled(250.60)        Past  Patient and responsible adult given discharge instructions with no questions regarding instructions. Taurus score 19. Pain level 0/10.  Discharged from unit via walking. Patient discharged to [facility].     Surgical History:   Procedure Laterality Date    BREAST BIOPSY Left 10 yrs ago    benign    CATARACT EXTRACTION Left     OS      SECTION      x2    EYE SURGERY      HYSTERECTOMY      partial    OOPHORECTOMY      one ovary conserved    RETINAL DETACHMENT SURGERY      buckle --OS    TONSILLECTOMY         Review of patient's allergies indicates:   Allergen Reactions    Dilaudid [hydromorphone] Anaphylaxis     Other reaction(s): Anaphylaxis  Other reaction(s): Unknown    Zyvox [linezolid in dextrose 5%] Shortness Of Breath       No current facility-administered medications on file prior to encounter.      Current Outpatient Medications on File Prior to Encounter   Medication Sig    albuterol (ACCUNEB) 0.63 mg/3 mL Nebu INHALE THE CONTENTS OF 1 VIAL VIA NEBULIZER EVERY 6 HOURS AS NEEDED    albuterol (VENTOLIN HFA) 90 mcg/actuation inhaler Inhale 2 puffs into the lungs every 6 (six) hours as needed. Rescue    apixaban (ELIQUIS) 5 mg Tab Take 1 tablet (5 mg total) by mouth 2 (two) times daily.    ascorbic acid, vitamin C, (VITAMIN C) 500 MG tablet Take 1 tablet (500 mg total) by mouth every evening.    aspirin (ECOTRIN) 81 MG EC tablet Take 81 mg by mouth once daily.    atorvastatin (LIPITOR) 20 MG tablet TAKE 1 TABLET EVERY DAY    BREO ELLIPTA 100-25 mcg/dose diskus inhaler INHALE 1 PUFF INTO THE LUNGS ONE TIME DAILY (CONTROLLER)    budesonide (PULMICORT) 0.5 mg/2 mL nebulizer solution INHALE THE CONTENTS OF 1 VIAL VIA NEBULIZER EVERY DAY    clonazePAM (KLONOPIN) 1 MG tablet 1 tab po bid prn (Patient taking differently: Take 1 mg by mouth 2 (two) times daily as needed for Anxiety. 1 tab po bid prn)    furosemide (LASIX) 40 MG tablet TAKE 1 TABLET ONE TIME DAILY  FOR  FLUID  OVERLOAD (Patient taking differently: TAKE 1 TABLET twice DAILY  FOR  FLUID  OVERLOAD)    gabapentin (NEURONTIN) 800 MG tablet TAKE ONE TABLET BY MOUTH THREE TIMES DAILY    HYDROcodone-acetaminophen (NORCO)  mg per  tablet Take 1 tablet by mouth every 8 (eight) hours as needed for Pain (Do not take more than 3 a day. Do not mix with other narcotics.).    KLOR-CON M20 20 mEq tablet TAKE ONE TABLET BY MOUTH ONCE DAILY    levalbuterol (XOPENEX) 0.63 mg/3 mL nebulizer solution INHALE THE CONTENTS OF 1 VIAL BY NEBULIZATION 4 times  DAILY.    DX: J43.9    lisinopril (PRINIVIL,ZESTRIL) 20 MG tablet TAKE 1 TABLET EVERY DAY    metFORMIN (GLUCOPHAGE) 500 MG tablet Take 1 tablet (500 mg total) by mouth 2 (two) times daily with meals.    metoprolol succinate (TOPROL-XL) 25 MG 24 hr tablet TAKE 1/2 TABLET EVERY DAY    multivitamin (THERAGRAN) tablet Take 1 tablet by mouth once daily.    nystatin-triamcinolone (MYCOLOG II) cream APPLY SPARINGLY TO AFFECTED AREA(S) 3 TIMES DAILY AS NEEDED    NYSTOP powder APPLY  POWDER TOPICALLY TO AFFECTED AREA 4 TIMES DAILY    omeprazole (PRILOSEC) 20 MG capsule Take 1 capsule (20 mg total) by mouth once daily.    polyethylene glycol (GLYCOLAX) 17 gram/dose powder     senna-docusate 8.6-50 mg (PERICOLACE) 8.6-50 mg per tablet Take 1 tablet by mouth 2 (two) times daily as needed for Constipation.    silver sulfADIAZINE 1% (SILVADENE) 1 % cream Apply topically once daily.    spironolactone (ALDACTONE) 25 MG tablet Take 1 tablet (25 mg total) by mouth once daily.    traZODone (DESYREL) 150 MG tablet TAKE 1 TABLET EVERY EVENING.     Family History     Problem Relation (Age of Onset)    Alcohol abuse Mother    Arthritis Father, Sister    Blindness Son    Cancer Brother    Crohn's disease Sister    Early death Sister (30)    Heart disease Father, Sister (32), Sister    No Known Problems Daughter        Tobacco Use    Smoking status: Current Some Day Smoker     Packs/day: 0.25     Years: 50.00     Pack years: 12.50     Types: Cigarettes     Start date: 1/19/1968    Smokeless tobacco: Never Used   Substance and Sexual Activity    Alcohol use: No     Alcohol/week: 0.0 oz     Frequency: Never     Drug use: No    Sexual activity: Not Currently     Review of Systems     Constitutional: Positive for fever.   HENT: Positive for congestion.    Eyes: Negative.    Respiratory: Positive for cough, shortness of breath and wheezing.    Cardiovascular: Negative.    Gastrointestinal: Negative.    Endocrine: Negative.    Genitourinary: Negative.    Musculoskeletal: Negative.    Skin: Negative.    Allergic/Immunologic: Negative.    Neurological: Negative.    Hematological: Negative.    Psychiatric/Behavioral: Negative.      Objective:     Vital Signs (Most Recent):  Temp: 99.3 °F (37.4 °C) (09/23/18 1539)  Pulse: 104 (09/23/18 1603)  Resp: (!) 26 (09/23/18 1409)  BP: (!) 129/57 (09/23/18 1603)  SpO2: 95 % (09/23/18 1603) Vital Signs (24h Range):  Temp:  [99.3 °F (37.4 °C)-102 °F (38.9 °C)] 99.3 °F (37.4 °C)  Pulse:  [104-115] 104  Resp:  [26] 26  SpO2:  [94 %-96 %] 95 %  BP: (129-142)/(57-69) 129/57     Weight: 134.3 kg (296 lb)  Body mass index is 45.01 kg/m².    Physical Exam   Constitutional: She is oriented to person, place, and time. She appears well-developed and well-nourished.   HENT:   Head: Normocephalic and atraumatic.   Eyes: EOM are normal. Pupils are equal, round, and reactive to light.   Neck: Normal range of motion. Neck supple.   Cardiovascular: Normal rate, regular rhythm, normal heart sounds and intact distal pulses.   Pulmonary/Chest: Effort normal. She has wheezes.   Abdominal: Soft. Bowel sounds are normal. Large pannus. Large ventral hernia  Musculoskeletal: Normal range of motion.   Neurological: She is alert and oriented to person, place, and time.   Skin: Skin is warm and dry.   There are several superficial ulcers (about 4)on the left pannus with surrounding erythema and tenderness. There is some necrotic tissue in one of the ulcers  Psychiatric: She has a normal mood and affect.         CRANIAL NERVES     CN III, IV, VI   Pupils are equal, round, and reactive to light.  Extraocular motions are  normal.        Significant Labs:   CBC:   Recent Labs   Lab  09/23/18   1509   WBC  15.80*   HGB  11.8*   HCT  37.8   PLT  261     CMP:   Recent Labs   Lab  09/23/18   1509   NA  140   K  4.7   CL  98   CO2  30*   GLU  95   BUN  13   CREATININE  0.7   CALCIUM  9.7   PROT  7.7   ALBUMIN  3.3*   BILITOT  0.4   ALKPHOS  94   AST  17   ALT  20   ANIONGAP  12   EGFRNONAA  >60       Significant Imaging: I have reviewed all pertinent imaging results/findings within the past 24 hours.    Assessment/Plan:     * Cellulitis of trunk    The pt will be treated with Vancomycin  I will ask sx to see her because she may need debridement          Diabetes mellitus with neuropathy    Patient's FSGs are controlled on current hypoglycemics.   Last A1c reviewed-   Lab Results   Component Value Date    HGBA1C 5.6 08/23/2018     Most recent fingerstick glucose reviewed-   Recent Labs   Lab  09/23/18   1417   POCTGLUCOSE  84     Current correctional scale  Low  Maintain anti-hyperglycemic dose as follows-   Antihyperglycemics (From admission, onward)    None                  Chronic atrial fibrillation    I will hold Eliquis in case she needs I&D          COPD (chronic obstructive pulmonary disease)    Pt with current exacerbation.  Treat with bronchodilators and steroids          Morbid obesity with BMI of 40.0-44.9, adult    Body mass index is 45.01 kg/m². Morbid obesity complicates all aspects of disease management from diagnostic modalities to treatment. Weight loss encouraged and health benefits explained to patient.              VTE Risk Mitigation (From admission, onward)    None             Louisa Menard MD  Department of Hospital Medicine   [facility]

## 2019-02-08 NOTE — OP NOTE
Patrick Hamlin MRN# [id number]   YOB: 1965 Age: 53 year old      Date of Admission:  2/8/2019  Date of Service:   2/08/19    Primary care provider: Sabino Beckford    PREOPERATIVE DIAGNOSIS:  Screening Colonoscopy        POSTOPERATIVE DIAGNOSIS:  Few diverticulosis          PROCEDURE:  Colonoscopy           INDICATIONS:  Screening colonoscopy.      Specimen: * No specimens in log *    SURGEON: Lucas Canada    DESCRIPTION OF PROCEDURE: Patrick Hamlin was brought into the endoscopy suite and placed in the left lateral decubitus position. After preprocedural pause and attended monitored anesthesia was administered, the external anus was inspected and was normal. Digital rectal exam was normal. The colonoscope was inserted and advanced under direct visualization to the level of the cecum which was identified by the appendiceal orifice and the ileocecal valve. The prep was excellent.. Upon slow withdrawal of the colonoscope, approximately 95% of the mucosa was directly visualized. There were a few diverticulosis.  The rest of the colon was without mucosal abnormality. There was no evidence of further polyps, inflammation, bleeding or AVMs. Retroflexion of the rectum was normal. The extra air was removed from the colon, and the colonoscope withdrawn. The patient tolerated the procedure well and was taken to postanesthesia care unit.     We invite the patient to return in 5 years for follow up screening due to family history.    Lucas Canada

## 2019-02-08 NOTE — ANESTHESIA CARE TRANSFER NOTE
Patient: Patrick Hamlin    Procedure(s):  COLONOSCOPY    Diagnosis: SCREENING FOR MALIGNANT NEOPLASM  Diagnosis Additional Information: No value filed.    Anesthesia Type:   MAC     Note:  Airway :Nasal Cannula  Patient transferred to:Phase II  Handoff Report: Identifed the Patient, Identified the Reponsible Provider, Reviewed the pertinent medical history, Discussed the surgical course, Reviewed Intra-OP anesthesia mangement and issues during anesthesia, Set expectations for post-procedure period and Allowed opportunity for questions and acknowledgement of understanding      Vitals: (Last set prior to Anesthesia Care Transfer)    CRNA VITALS  2/8/2019 0726 - 2/8/2019 0758      2/8/2019             Pulse:  73    SpO2:  96 %                Electronically Signed By: AZALEA Ferris CRNA  February 8, 2019  7:58 AM
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
## 2019-02-08 NOTE — DISCHARGE INSTRUCTIONS
INSTRUCTIONS AFTER COLONOSCOPY    WHEN YOU ARE BACK HOME:    Plan to rest for an hour or two after you get home.    You may have some cramping or pressure until you pass gas.    You may resume your regular medications.    Eat a small, light meal at first, and then gradually return to normal meal sizes.  If you had a polyp removed:    Slight bleeding may occur.  You may have a slight blood stain on the toilet paper after a bowel movement.    To lessen the chance of bleeding, avoid heavy exercise for ONE WEEK.  This includes heavy lifting, vigorous sport activities, and heavy physical labor.  You may resume your normal sexual activity.      Avoid aspirin or aspirin products if instructed by your doctor.    WHAT TO WATCH FOR:  Problems rarely occur after the exam; however, it is important for you to watch for early signs of possible problems.  If you have     Unusual pain in your abdomen    Nausea and vomiting that persists    Excessive bleeding    Black or bloody bowel movements    Fever or temperature above 100.6 F  Please call your doctor ([facility] [phone]) or go to the nearest hospital emergency room.    Post-Anesthesia Patient Instructions    IMMEDIATELY FOLLOWING SURGERY:  Do not drive or operate machinery for the first twenty four hours after surgery.  Do not make any important decisions for twenty four hours after surgery or while taking narcotic pain medications or sedatives.  If you develop intractable nausea and vomiting or a severe headache please notify your doctor immediately.    FOLLOW-UP:  Please make an appointment with your surgeon as instructed. You do not need to follow up with anesthesia unless specifically instructed to do so.    WOUND CARE INSTRUCTIONS (if applicable):  Keep a dry clean dressing on the anesthesia/puncture wound site if there is drainage.  Once the wound has quit draining you may leave it open to air.  Generally you should leave the bandage intact for twenty four  hours unless there is drainage.  If the epidural site drains for more than 36-48 hours please call the anesthesia department.    QUESTIONS?:  Please feel free to call your physician or the hospital  if you have any questions, and they will be happy to assist you.

## 2019-02-08 NOTE — ANESTHESIA POSTPROCEDURE EVALUATION
Patient: Patrick Hamlin    Procedure(s):  COLONOSCOPY    Diagnosis:SCREENING FOR MALIGNANT NEOPLASM  Diagnosis Additional Information: No value filed.    Anesthesia Type:  MAC    Note:  Anesthesia Post Evaluation    Patient location during evaluation: Phase 2 and Bedside  Patient participation: Able to fully participate in evaluation  Level of consciousness: awake and alert  Pain management: adequate  Airway patency: patent  Cardiovascular status: acceptable  Respiratory status: acceptable  Hydration status: stable  PONV: none     Anesthetic complications: None          Last vitals:  Vitals:    02/08/19 0815 02/08/19 0820 02/08/19 0825   BP: 117/87 122/94    Resp: 16 16 16   Temp:      SpO2: 94% 95% 96%         Electronically Signed By: Andrea Vallejo MD  February 8, 2019  9:29 AM

## 2019-05-06 ENCOUNTER — OFFICE VISIT (OUTPATIENT)
Dept: SURGERY | Facility: OTHER | Age: 54
End: 2019-05-06
Attending: SURGERY
Payer: COMMERCIAL

## 2019-05-06 VITALS
BODY MASS INDEX: 25.73 KG/M2 | WEIGHT: 190 LBS | HEIGHT: 72 IN | OXYGEN SATURATION: 95 % | TEMPERATURE: 97.4 F | HEART RATE: 65 BPM | SYSTOLIC BLOOD PRESSURE: 120 MMHG | DIASTOLIC BLOOD PRESSURE: 76 MMHG

## 2019-05-06 DIAGNOSIS — L98.9 SKIN LESION: Primary | ICD-10-CM

## 2019-05-06 PROCEDURE — 88305 TISSUE EXAM BY PATHOLOGIST: CPT | Mod: TC | Performed by: SURGERY

## 2019-05-06 PROCEDURE — 12032 INTMD RPR S/A/T/EXT 2.6-7.5: CPT | Performed by: SURGERY

## 2019-05-06 PROCEDURE — 11404 EXC TR-EXT B9+MARG 3.1-4 CM: CPT | Mod: 51 | Performed by: SURGERY

## 2019-05-06 ASSESSMENT — PAIN SCALES - GENERAL: PAINLEVEL: NO PAIN (0)

## 2019-05-06 ASSESSMENT — MIFFLIN-ST. JEOR: SCORE: 1754.83

## 2019-05-06 NOTE — PROGRESS NOTES
[facility] Surgery  Minor Procedure Note    Preoperative diagnosis:  Lipoma     Postoperative diagnosis:  Same     Procedure:  Excisional biopsy left side.     Anesthesia:  Local    History: 53 year old year old male with history of growing left flank mass for outpatient excision.    Findings:  Fatty tissue mass adherent to the latissimus musculature    Tissue to pathology:    Side mass, fat.     Details:  Pre procedure carlton was placed and the requisite time out pause observed during which the patient confirmed their identity, date of birth, side and site of the requested excision.  The region was prepped and draped sterilely; local anesthesia was obtained with infiltration of 10 cc of 1% lidocaine w/ epi.  A transverse incision was made over the presenting portion of the mass and carried through full thickness skin.  Using combination of blunt and sharp dissection the mass was delivered through the incision.  The wound was irrigated and closed in layers with interrupted 3-0 Vicryl in the subcutaneous tissue.  The skin was reapproximated with 4-0 monocryl in a subcuticular fashion.  Steri Strips and sterile dressings were applied.   The patient was observed in the treatment room for 15 minutes following the procedure without sequelae.  The procedure was well tolerated and the patient was discharged with wound care instructions and followup appointment.       Lucas Canada

## 2019-05-06 NOTE — PATIENT INSTRUCTIONS
Thank you for allowing Dr. Canada and the surgical team to participate in your care today. If you have any questions or concerns, you can call Dr. Canada office at [phone] or [phone] between the hours of 8AM and 4:30PM Monday through Friday.     POST PROCEDURE INSTRUCTIONS      Apply ice to the surgical area to reduce swelling. (no longer than 20 minutes at a time)    Remove your dressing in 48 hours    Keep incision clean and dry   Do NOT soak in water such as a tub bath or swimming   Do NOT put make-up, deodorant, powders, hairspray, lotions, etc on the incision    Cover with a clean dressing daily or when wet/soiled    If you have steri-strips, these will fall off on their own in 7 days. If they are still adhered after 7 days, you may remove them by pulling gently.     You can use acetaminophen(Tylenol) or the prescription you received for pain.     If you have any bleeding, cover the wound with clean gauze and hold pressure for 10 Minutes. If the bleeding does not stop or is heavy and profuse, call the clinic or go to the Urgent Care/Emergency Department.      SIGNS OF INFECTION ARE:    Redness, swelling, red streaks, pus, drainage, warmth, fever, increased pain, foul smell.     Contact your surgeon or primary health care provider if you notice any of the warning signs.     FOLLOW - UP     As needed

## 2019-05-08 LAB — COPATH REPORT: NORMAL

## 2019-05-14 ENCOUNTER — OFFICE VISIT (OUTPATIENT)
Dept: SURGERY | Facility: OTHER | Age: 54
End: 2019-05-14
Attending: SURGERY
Payer: COMMERCIAL

## 2019-05-14 VITALS
TEMPERATURE: 96.7 F | BODY MASS INDEX: 25.73 KG/M2 | WEIGHT: 190 LBS | SYSTOLIC BLOOD PRESSURE: 116 MMHG | DIASTOLIC BLOOD PRESSURE: 62 MMHG | OXYGEN SATURATION: 96 % | HEIGHT: 72 IN | HEART RATE: 63 BPM

## 2019-05-14 DIAGNOSIS — Z51.89 VISIT FOR WOUND CHECK: Primary | ICD-10-CM

## 2019-05-14 PROCEDURE — 99024 POSTOP FOLLOW-UP VISIT: CPT | Performed by: SURGERY

## 2019-05-14 ASSESSMENT — PAIN SCALES - GENERAL: PAINLEVEL: NO PAIN (0)

## 2019-05-14 ASSESSMENT — MIFFLIN-ST. JEOR: SCORE: 1754.83

## 2019-05-14 NOTE — NURSING NOTE
Chief Complaint   Patient presents with     Surgical Followup     s/p- excisional biopsy left flank mass 5/6/19       Initial /62 (BP Location: Left arm, Patient Position: Chair, Cuff Size: Adult Regular)   Pulse 63   Temp 96.7  F (35.9  C) (Tympanic)   Ht 1.829 m (6')   Wt 86.2 kg (190 lb)   SpO2 96%   BMI 25.77 kg/m   Estimated body mass index is 25.77 kg/m  as calculated from the following:    Height as of this encounter: 1.829 m (6').    Weight as of this encounter: 86.2 kg (190 lb).  Medication Reconciliation: complete    ANTHONY SILVER LPN

## 2019-05-14 NOTE — PATIENT INSTRUCTIONS
Thank you for allowing Dr. Canada and our surgical team to participate in your care.  If you have a scheduling or an appointment question please contact Audrey, our Health Unit Coordinator at her direct line [phone].   ALL nursing questions or concerns can be directed to your surgical nurse at: [phone]

## 2019-05-15 NOTE — PROGRESS NOTES
Return to clinic for wound check one week following lipoma removal, her admits to swelling and fluctuance under the incision. No pain or redness. Appears to have developed a likely hematoma/seroma. No evidence of infection. Discussed this should resolve in time. If concerns for infection would open incision.     Lucas Canada

## 2019-12-09 DIAGNOSIS — Z00.00 ROUTINE HISTORY AND PHYSICAL EXAMINATION OF ADULT: Primary | ICD-10-CM

## 2019-12-09 DIAGNOSIS — M10.9 GOUT, UNSPECIFIED CAUSE, UNSPECIFIED CHRONICITY, UNSPECIFIED SITE: ICD-10-CM

## 2019-12-11 RX ORDER — FEBUXOSTAT 40 MG/1
TABLET ORAL
Qty: 90 TABLET | Refills: 3 | Status: SHIPPED | OUTPATIENT
Start: 2019-12-11 | End: 2020-12-08

## 2019-12-11 NOTE — TELEPHONE ENCOUNTER
febuxostat (ULORIC) 40 MG TABS tablet      Last Written Prescription Date:  12/7/18  Last Fill Quantity: 90,   # refills: 3  Last Office Visit: 12/7/18  Future Office visit:       Routing refill request to provider for review/approval because:   CBC on file in past 12 months    ALT on file in past 12 months    Has Uric Acid on file in past 12 months and value is less than 6    Recent (12 mo) or future (30 days) visit within the authorizing provider's specialty    Normal serum creatinine on file in the past 12 months       Labs pended. Please advise. Thank you!

## 2020-03-02 ENCOUNTER — HEALTH MAINTENANCE LETTER (OUTPATIENT)
Age: 55
End: 2020-03-02

## 2020-06-30 ENCOUNTER — APPOINTMENT (OUTPATIENT)
Dept: MRI IMAGING | Facility: HOSPITAL | Age: 55
End: 2020-06-30
Attending: EMERGENCY MEDICINE
Payer: COMMERCIAL

## 2020-06-30 ENCOUNTER — HOSPITAL ENCOUNTER (EMERGENCY)
Facility: HOSPITAL | Age: 55
Discharge: HOME | End: 2020-06-30
Attending: EMERGENCY MEDICINE | Admitting: EMERGENCY MEDICINE
Payer: COMMERCIAL

## 2020-06-30 VITALS
HEART RATE: 60 BPM | RESPIRATION RATE: 16 BRPM | WEIGHT: 195 LBS | DIASTOLIC BLOOD PRESSURE: 98 MMHG | OXYGEN SATURATION: 96 % | BODY MASS INDEX: 26.45 KG/M2 | TEMPERATURE: 97.9 F | SYSTOLIC BLOOD PRESSURE: 132 MMHG

## 2020-06-30 DIAGNOSIS — R42 DIZZINESS: ICD-10-CM

## 2020-06-30 LAB
ALBUMIN SERPL-MCNC: 4 G/DL (ref 3.4–5)
ALBUMIN UR-MCNC: NEGATIVE MG/DL
ALP SERPL-CCNC: 67 U/L (ref 40–150)
ALT SERPL W P-5'-P-CCNC: 39 U/L (ref 0–70)
ANION GAP SERPL CALCULATED.3IONS-SCNC: 4 MMOL/L (ref 3–14)
APPEARANCE UR: CLEAR
AST SERPL W P-5'-P-CCNC: 20 U/L (ref 0–45)
BACTERIA #/AREA URNS HPF: ABNORMAL /HPF
BASOPHILS # BLD AUTO: 0 10E9/L (ref 0–0.2)
BASOPHILS NFR BLD AUTO: 0.6 %
BILIRUB SERPL-MCNC: 0.7 MG/DL (ref 0.2–1.3)
BILIRUB UR QL STRIP: NEGATIVE
BUN SERPL-MCNC: 20 MG/DL (ref 7–30)
CALCIUM SERPL-MCNC: 8.9 MG/DL (ref 8.5–10.1)
CHLORIDE SERPL-SCNC: 112 MMOL/L (ref 94–109)
CO2 SERPL-SCNC: 27 MMOL/L (ref 20–32)
COLOR UR AUTO: YELLOW
CREAT SERPL-MCNC: 1.01 MG/DL (ref 0.66–1.25)
DIFFERENTIAL METHOD BLD: NORMAL
EOSINOPHIL # BLD AUTO: 0.2 10E9/L (ref 0–0.7)
EOSINOPHIL NFR BLD AUTO: 4.4 %
ERYTHROCYTE [DISTWIDTH] IN BLOOD BY AUTOMATED COUNT: 12 % (ref 10–15)
ETHANOL SERPL-MCNC: <0.01 G/DL
GFR SERPL CREATININE-BSD FRML MDRD: 85 ML/MIN/{1.73_M2}
GLUCOSE SERPL-MCNC: 126 MG/DL (ref 70–99)
GLUCOSE UR STRIP-MCNC: NEGATIVE MG/DL
HCT VFR BLD AUTO: 45.7 % (ref 40–53)
HGB BLD-MCNC: 16 G/DL (ref 13.3–17.7)
HGB UR QL STRIP: NEGATIVE
IMM GRANULOCYTES # BLD: 0 10E9/L (ref 0–0.4)
IMM GRANULOCYTES NFR BLD: 0.4 %
INR PPP: 0.95 (ref 0.86–1.14)
KETONES UR STRIP-MCNC: NEGATIVE MG/DL
LEUKOCYTE ESTERASE UR QL STRIP: NEGATIVE
LYMPHOCYTES # BLD AUTO: 1.1 10E9/L (ref 0.8–5.3)
LYMPHOCYTES NFR BLD AUTO: 21.8 %
MCH RBC QN AUTO: 31.3 PG (ref 26.5–33)
MCHC RBC AUTO-ENTMCNC: 35 G/DL (ref 31.5–36.5)
MCV RBC AUTO: 89 FL (ref 78–100)
MONOCYTES # BLD AUTO: 0.4 10E9/L (ref 0–1.3)
MONOCYTES NFR BLD AUTO: 8.7 %
MUCOUS THREADS #/AREA URNS LPF: PRESENT /LPF
NEUTROPHILS # BLD AUTO: 3.2 10E9/L (ref 1.6–8.3)
NEUTROPHILS NFR BLD AUTO: 64.1 %
NITRATE UR QL: NEGATIVE
NRBC # BLD AUTO: 0 10*3/UL
NRBC BLD AUTO-RTO: 0 /100
PH UR STRIP: 5 PH (ref 4.7–8)
PLATELET # BLD AUTO: 234 10E9/L (ref 150–450)
POTASSIUM SERPL-SCNC: 4.1 MMOL/L (ref 3.4–5.3)
PROT SERPL-MCNC: 7.8 G/DL (ref 6.8–8.8)
RBC # BLD AUTO: 5.11 10E12/L (ref 4.4–5.9)
RBC #/AREA URNS AUTO: <1 /HPF (ref 0–2)
SODIUM SERPL-SCNC: 143 MMOL/L (ref 133–144)
SOURCE: ABNORMAL
SP GR UR STRIP: 1.02 (ref 1–1.03)
TROPONIN I SERPL-MCNC: <0.015 UG/L (ref 0–0.04)
UROBILINOGEN UR STRIP-MCNC: NORMAL MG/DL (ref 0–2)
WBC # BLD AUTO: 5.1 10E9/L (ref 4–11)
WBC #/AREA URNS AUTO: 1 /HPF (ref 0–5)

## 2020-06-30 PROCEDURE — 36415 COLL VENOUS BLD VENIPUNCTURE: CPT | Performed by: EMERGENCY MEDICINE

## 2020-06-30 PROCEDURE — 25000128 H RX IP 250 OP 636: Performed by: EMERGENCY MEDICINE

## 2020-06-30 PROCEDURE — 96361 HYDRATE IV INFUSION ADD-ON: CPT

## 2020-06-30 PROCEDURE — 80320 DRUG SCREEN QUANTALCOHOLS: CPT | Performed by: EMERGENCY MEDICINE

## 2020-06-30 PROCEDURE — 70544 MR ANGIOGRAPHY HEAD W/O DYE: CPT | Mod: TC,XS

## 2020-06-30 PROCEDURE — 70553 MRI BRAIN STEM W/O & W/DYE: CPT | Mod: TC

## 2020-06-30 PROCEDURE — 99285 EMERGENCY DEPT VISIT HI MDM: CPT | Mod: 25

## 2020-06-30 PROCEDURE — 85610 PROTHROMBIN TIME: CPT

## 2020-06-30 PROCEDURE — 96375 TX/PRO/DX INJ NEW DRUG ADDON: CPT | Mod: XU

## 2020-06-30 PROCEDURE — 25500064 ZZH RX 255 OP 636: Performed by: RADIOLOGY

## 2020-06-30 PROCEDURE — 25000132 ZZH RX MED GY IP 250 OP 250 PS 637: Performed by: EMERGENCY MEDICINE

## 2020-06-30 PROCEDURE — 96374 THER/PROPH/DIAG INJ IV PUSH: CPT | Mod: XU

## 2020-06-30 PROCEDURE — 81001 URINALYSIS AUTO W/SCOPE: CPT | Performed by: EMERGENCY MEDICINE

## 2020-06-30 PROCEDURE — 99285 EMERGENCY DEPT VISIT HI MDM: CPT | Mod: Z6 | Performed by: EMERGENCY MEDICINE

## 2020-06-30 PROCEDURE — A9585 GADOBUTROL INJECTION: HCPCS | Performed by: RADIOLOGY

## 2020-06-30 PROCEDURE — 70549 MR ANGIOGRAPH NECK W/O&W/DYE: CPT | Mod: TC

## 2020-06-30 PROCEDURE — 25800030 ZZH RX IP 258 OP 636: Performed by: EMERGENCY MEDICINE

## 2020-06-30 PROCEDURE — 85025 COMPLETE CBC W/AUTO DIFF WBC: CPT | Performed by: EMERGENCY MEDICINE

## 2020-06-30 PROCEDURE — 84484 ASSAY OF TROPONIN QUANT: CPT | Performed by: EMERGENCY MEDICINE

## 2020-06-30 PROCEDURE — 80053 COMPREHEN METABOLIC PANEL: CPT | Performed by: EMERGENCY MEDICINE

## 2020-06-30 RX ORDER — SODIUM CHLORIDE 9 MG/ML
1000 INJECTION, SOLUTION INTRAVENOUS CONTINUOUS
Status: DISCONTINUED | OUTPATIENT
Start: 2020-06-30 | End: 2020-06-30 | Stop reason: HOSPADM

## 2020-06-30 RX ORDER — GADOBUTROL 604.72 MG/ML
10 INJECTION INTRAVENOUS ONCE
Status: COMPLETED | OUTPATIENT
Start: 2020-06-30 | End: 2020-06-30

## 2020-06-30 RX ORDER — MECLIZINE HYDROCHLORIDE 25 MG/1
25 TABLET ORAL 3 TIMES DAILY PRN
Qty: 10 TABLET | Refills: 0 | Status: SHIPPED | OUTPATIENT
Start: 2020-06-30 | End: 2022-03-12

## 2020-06-30 RX ORDER — ONDANSETRON 2 MG/ML
4 INJECTION INTRAMUSCULAR; INTRAVENOUS ONCE
Status: COMPLETED | OUTPATIENT
Start: 2020-06-30 | End: 2020-06-30

## 2020-06-30 RX ORDER — MECLIZINE HYDROCHLORIDE 25 MG/1
50 TABLET ORAL ONCE
Status: COMPLETED | OUTPATIENT
Start: 2020-06-30 | End: 2020-06-30

## 2020-06-30 RX ADMIN — ONDANSETRON 4 MG: 2 INJECTION INTRAMUSCULAR; INTRAVENOUS at 07:45

## 2020-06-30 RX ADMIN — SODIUM CHLORIDE 1000 ML: 9 INJECTION, SOLUTION INTRAVENOUS at 07:45

## 2020-06-30 RX ADMIN — MECLIZINE HYDROCHLORIDE 50 MG: 25 TABLET ORAL at 07:48

## 2020-06-30 RX ADMIN — GADOBUTROL 10 ML: 604.72 INJECTION INTRAVENOUS at 09:09

## 2020-06-30 ASSESSMENT — ENCOUNTER SYMPTOMS
CONSTITUTIONAL NEGATIVE: 1
ALLERGIC/IMMUNOLOGIC NEGATIVE: 1
EYES NEGATIVE: 1
ENDOCRINE NEGATIVE: 1
SLEEP DISTURBANCE: 0
GASTROINTESTINAL NEGATIVE: 1
CARDIOVASCULAR NEGATIVE: 1
ACTIVITY CHANGE: 0
HEADACHES: 0
LIGHT-HEADEDNESS: 0
MUSCULOSKELETAL NEGATIVE: 1
PSYCHIATRIC NEGATIVE: 1
HEMATOLOGIC/LYMPHATIC NEGATIVE: 1
NEUROLOGICAL NEGATIVE: 1
TREMORS: 0
FACIAL ASYMMETRY: 0
WEAKNESS: 0
RESPIRATORY NEGATIVE: 1

## 2020-06-30 NOTE — ED PROVIDER NOTES
Patient care transferred to me at 8am from  when diagnostic MRI of brain and neck were pending    10:37 AM  MRI brain without contrast without intracranial stenoses, occlusions or aneurysms.  Normal brain    MRA neck with and without contrast with no stenoses or occlusions in the vessels of the neck    Patient subjectively felt better, his dizziness improved, he was able to ambulate, no focal neurologic deficits, normal mentation, not confused  He was recommended to drink plenty of fluids, use meclizine 25 mg as needed for symptom control, follow-up with primary care provider this week in the clinic  He will return to ED if symptoms worsen or any concerns.      ICD-10-CM    1. Dizziness  R42           Nolan Allen MD  06/30/20 104

## 2020-06-30 NOTE — DISCHARGE INSTRUCTIONS
Please drink plenty of fluids, use meclizine 25 mg as needed for symptom control, schedule follow-up visit with your primary care provider in 1 week in the clinic, return to ED if symptoms worsen or any concerns.    MRI Contrast Discharge Instructions    The IV contrast you received today will pass out of your body in your  urine. This will happen in the next 24 hours. You will not feel this process.  Your urine will not change color.    Drink at least 4 extra glasses of water or juice today (unless your doctor  has restricted your fluids). This reduces the stress on your kidneys.  You may take your regular medicines.    If you are on dialysis: It is best to have dialysis today.    If you have a reaction: Most reactions happen right away. If you have  any new symptoms after leaving the hospital (such as hives or swelling),  call your hospital at the correct number below. Or call your family doctor.  If you have breathing distress or wheezing, call 911.    Special instructions:     I have read and understand the above information.    Signature:______________________________________ Date:9-34-31___________    Staff:__________________________________________ Date:___________     Time:__________    [facility] Departments:    ___[place]: [phone]  ___[place]: [phone]  ___[place]: [phone] ___[place]: [phone]  ___[facility]: [phone]  ___[facility]: [phone]  ___[phone]  ___[facility]: [phone]  ___[phone]
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
## 2020-06-30 NOTE — ED AVS SNAPSHOT
[facility]  [address]  [place] [address]  Phone:  [phone]                                    Patrick Hamlin   MRN: [id number]    Department:  [facility]   Date of Visit:  6/30/2020           After Visit Summary Signature Page    I have received my discharge instructions, and my questions have been answered. I have discussed any challenges I see with this plan with the nurse or doctor.    ..........................................................................................................................................  Patient/Patient Representative Signature      ..........................................................................................................................................  Patient Representative Print Name and Relationship to Patient    ..................................................               ................................................  Date                                   Time    ..........................................................................................................................................  Reviewed by Signature/Title    ...................................................              ..............................................  Date                                               Time          22EPIC Rev 08/18

## 2020-09-23 ENCOUNTER — HOSPITAL ENCOUNTER (EMERGENCY)
Facility: HOSPITAL | Age: 55
Discharge: HOME | End: 2020-09-23
Attending: FAMILY MEDICINE | Admitting: FAMILY MEDICINE
Payer: COMMERCIAL

## 2020-09-23 ENCOUNTER — APPOINTMENT (OUTPATIENT)
Dept: GENERAL RADIOLOGY | Facility: HOSPITAL | Age: 55
End: 2020-09-23
Attending: FAMILY MEDICINE
Payer: COMMERCIAL

## 2020-09-23 VITALS
SYSTOLIC BLOOD PRESSURE: 154 MMHG | RESPIRATION RATE: 16 BRPM | TEMPERATURE: 97.9 F | HEART RATE: 63 BPM | OXYGEN SATURATION: 97 % | DIASTOLIC BLOOD PRESSURE: 110 MMHG

## 2020-09-23 DIAGNOSIS — S20.219A CONTUSION OF RIB, UNSPECIFIED LATERALITY, INITIAL ENCOUNTER: ICD-10-CM

## 2020-09-23 PROCEDURE — 99283 EMERGENCY DEPT VISIT LOW MDM: CPT | Mod: Z6 | Performed by: FAMILY MEDICINE

## 2020-09-23 PROCEDURE — 25000132 ZZH RX MED GY IP 250 OP 250 PS 637: Performed by: FAMILY MEDICINE

## 2020-09-23 PROCEDURE — 71101 X-RAY EXAM UNILAT RIBS/CHEST: CPT | Mod: TC,RT

## 2020-09-23 PROCEDURE — 25000132 ZZH RX MED GY IP 250 OP 250 PS 637: Performed by: EMERGENCY MEDICINE

## 2020-09-23 PROCEDURE — 99283 EMERGENCY DEPT VISIT LOW MDM: CPT

## 2020-09-23 RX ORDER — CYCLOBENZAPRINE HCL 10 MG
10 TABLET ORAL AT BEDTIME
Qty: 6 TABLET | Refills: 0 | Status: SHIPPED | OUTPATIENT
Start: 2020-09-23 | End: 2020-09-29

## 2020-09-23 RX ORDER — IBUPROFEN 800 MG/1
800 TABLET, FILM COATED ORAL EVERY 8 HOURS PRN
COMMUNITY

## 2020-09-23 RX ORDER — CYCLOBENZAPRINE HCL 10 MG
10 TABLET ORAL ONCE
Status: COMPLETED | OUTPATIENT
Start: 2020-09-23 | End: 2020-09-23

## 2020-09-23 RX ORDER — HYDROCODONE BITARTRATE AND ACETAMINOPHEN 5; 325 MG/1; MG/1
2 TABLET ORAL ONCE
Status: COMPLETED | OUTPATIENT
Start: 2020-09-23 | End: 2020-09-23

## 2020-09-23 RX ADMIN — CYCLOBENZAPRINE 10 MG: 10 TABLET, FILM COATED ORAL at 07:25

## 2020-09-23 RX ADMIN — HYDROCODONE BITARTRATE AND ACETAMINOPHEN 2 TABLET: 5; 325 TABLET ORAL at 08:41

## 2020-09-23 ASSESSMENT — ENCOUNTER SYMPTOMS
ABDOMINAL PAIN: 0
HEADACHES: 0
COLOR CHANGE: 0
FEVER: 0
EYE REDNESS: 0
SHORTNESS OF BREATH: 0
DIFFICULTY URINATING: 0
NECK STIFFNESS: 0
CONFUSION: 0
ARTHRALGIAS: 0
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
## 2020-09-23 NOTE — DISCHARGE INSTRUCTIONS
1) Follow the aftercare instructions provided.   2) You have been given a prescription for a medication that can cause an allergic reactions. Return to the ER if you develop any itching, tongue swelling, wheezing or shortness of breath.  3) You have been given a medication or prescription for a medication that can make you drowsy. Do not drink, drive, ride a bicycle or operate any heavy machinery while using this medication.   4) You must follow up with your doctor in 12 to 24 hours or return to the ER for a recheck.   5) Your blood pressure today was elevated. You must have it rechecked by your primary care doctor within one week.
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
## 2020-09-23 NOTE — ED AVS SNAPSHOT
[facility]  [address]  [place] [address]  Phone:  [phone]                                    Patrick Hamlin   MRN: [id number]    Department:  [facility]   Date of Visit:  9/23/2020           After Visit Summary Signature Page    I have received my discharge instructions, and my questions have been answered. I have discussed any challenges I see with this plan with the nurse or doctor.    ..........................................................................................................................................  Patient/Patient Representative Signature      ..........................................................................................................................................  Patient Representative Print Name and Relationship to Patient    ..................................................               ................................................  Date                                   Time    ..........................................................................................................................................  Reviewed by Signature/Title    ...................................................              ..............................................  Date                                               Time          22EPIC Rev 08/18

## 2020-12-14 ENCOUNTER — HEALTH MAINTENANCE LETTER (OUTPATIENT)
Age: 55
End: 2020-12-14

## 2021-04-18 ENCOUNTER — HEALTH MAINTENANCE LETTER (OUTPATIENT)
Age: 56
End: 2021-04-18

## 2021-10-02 ENCOUNTER — HEALTH MAINTENANCE LETTER (OUTPATIENT)
Age: 56
End: 2021-10-02
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
## 2022-02-18 PROBLEM — R35.1 NOCTURIA: Status: ACTIVE | Noted: 2022-02-18

## 2022-02-21 ENCOUNTER — OFFICE VISIT (OUTPATIENT)
Dept: FAMILY MEDICINE | Facility: OTHER | Age: 57
End: 2022-02-21
Attending: FAMILY MEDICINE
Payer: COMMERCIAL

## 2022-02-21 ENCOUNTER — LAB (OUTPATIENT)
Dept: LAB | Facility: OTHER | Age: 57
End: 2022-02-21
Attending: FAMILY MEDICINE
Payer: COMMERCIAL

## 2022-02-21 VITALS
RESPIRATION RATE: 16 BRPM | TEMPERATURE: 97.4 F | WEIGHT: 188 LBS | OXYGEN SATURATION: 97 % | BODY MASS INDEX: 25.5 KG/M2 | DIASTOLIC BLOOD PRESSURE: 88 MMHG | HEART RATE: 75 BPM | SYSTOLIC BLOOD PRESSURE: 110 MMHG

## 2022-02-21 DIAGNOSIS — Z00.00 ROUTINE GENERAL MEDICAL EXAMINATION AT A HEALTH CARE FACILITY: ICD-10-CM

## 2022-02-21 DIAGNOSIS — Z80.42 FH: PROSTATE CANCER: ICD-10-CM

## 2022-02-21 DIAGNOSIS — R35.1 NOCTURIA: ICD-10-CM

## 2022-02-21 DIAGNOSIS — R73.9 ELEVATED BLOOD SUGAR: ICD-10-CM

## 2022-02-21 DIAGNOSIS — Z80.0 FH: COLON CANCER: ICD-10-CM

## 2022-02-21 DIAGNOSIS — E78.2 MIXED HYPERLIPIDEMIA: ICD-10-CM

## 2022-02-21 DIAGNOSIS — Z23 NEED FOR VACCINATION: ICD-10-CM

## 2022-02-21 DIAGNOSIS — Z71.85 VACCINE COUNSELING: ICD-10-CM

## 2022-02-21 DIAGNOSIS — Z00.00 ROUTINE GENERAL MEDICAL EXAMINATION AT A HEALTH CARE FACILITY: Primary | ICD-10-CM

## 2022-02-21 LAB
ALBUMIN SERPL-MCNC: 3.9 G/DL (ref 3.4–5)
ALP SERPL-CCNC: 67 U/L (ref 40–150)
ALT SERPL W P-5'-P-CCNC: 45 U/L (ref 0–70)
ANION GAP SERPL CALCULATED.3IONS-SCNC: 2 MMOL/L (ref 3–14)
AST SERPL W P-5'-P-CCNC: 27 U/L (ref 0–45)
BASOPHILS # BLD AUTO: 0.1 10E3/UL (ref 0–0.2)
BASOPHILS NFR BLD AUTO: 1 %
BILIRUB SERPL-MCNC: 1.3 MG/DL (ref 0.2–1.3)
BUN SERPL-MCNC: 17 MG/DL (ref 7–30)
CALCIUM SERPL-MCNC: 9.2 MG/DL (ref 8.5–10.1)
CHLORIDE BLD-SCNC: 110 MMOL/L (ref 94–109)
CHOLEST SERPL-MCNC: 204 MG/DL
CO2 SERPL-SCNC: 28 MMOL/L (ref 20–32)
CREAT SERPL-MCNC: 1.09 MG/DL (ref 0.66–1.25)
EOSINOPHIL # BLD AUTO: 0.1 10E3/UL (ref 0–0.7)
EOSINOPHIL NFR BLD AUTO: 3 %
ERYTHROCYTE [DISTWIDTH] IN BLOOD BY AUTOMATED COUNT: 12.5 % (ref 10–15)
FASTING STATUS PATIENT QL REPORTED: YES
GFR SERPL CREATININE-BSD FRML MDRD: 81 ML/MIN/1.73M2
GLUCOSE BLD-MCNC: 112 MG/DL (ref 70–99)
HCT VFR BLD AUTO: 47.4 % (ref 40–53)
HDLC SERPL-MCNC: 51 MG/DL
HGB BLD-MCNC: 16.3 G/DL (ref 13.3–17.7)
IMM GRANULOCYTES # BLD: 0 10E3/UL
IMM GRANULOCYTES NFR BLD: 0 %
LDLC SERPL CALC-MCNC: 121 MG/DL
LYMPHOCYTES # BLD AUTO: 1.2 10E3/UL (ref 0.8–5.3)
LYMPHOCYTES NFR BLD AUTO: 24 %
MCH RBC QN AUTO: 31.3 PG (ref 26.5–33)
MCHC RBC AUTO-ENTMCNC: 34.4 G/DL (ref 31.5–36.5)
MCV RBC AUTO: 91 FL (ref 78–100)
MONOCYTES # BLD AUTO: 0.5 10E3/UL (ref 0–1.3)
MONOCYTES NFR BLD AUTO: 11 %
NEUTROPHILS # BLD AUTO: 3 10E3/UL (ref 1.6–8.3)
NEUTROPHILS NFR BLD AUTO: 61 %
NONHDLC SERPL-MCNC: 153 MG/DL
NRBC # BLD AUTO: 0 10E3/UL
NRBC BLD AUTO-RTO: 0 /100
PLATELET # BLD AUTO: 230 10E3/UL (ref 150–450)
POTASSIUM BLD-SCNC: 4.3 MMOL/L (ref 3.4–5.3)
PROT SERPL-MCNC: 7.4 G/DL (ref 6.8–8.8)
PSA SERPL-MCNC: 0.43 UG/L (ref 0–4)
RBC # BLD AUTO: 5.21 10E6/UL (ref 4.4–5.9)
SODIUM SERPL-SCNC: 140 MMOL/L (ref 133–144)
TRIGL SERPL-MCNC: 160 MG/DL
WBC # BLD AUTO: 4.9 10E3/UL (ref 4–11)

## 2022-02-21 PROCEDURE — 85025 COMPLETE CBC W/AUTO DIFF WBC: CPT

## 2022-02-21 PROCEDURE — 80053 COMPREHEN METABOLIC PANEL: CPT

## 2022-02-21 PROCEDURE — 84153 ASSAY OF PSA TOTAL: CPT

## 2022-02-21 PROCEDURE — 36415 COLL VENOUS BLD VENIPUNCTURE: CPT

## 2022-02-21 PROCEDURE — 99396 PREV VISIT EST AGE 40-64: CPT | Performed by: FAMILY MEDICINE

## 2022-02-21 PROCEDURE — 80061 LIPID PANEL: CPT

## 2022-02-21 ASSESSMENT — ANXIETY QUESTIONNAIRES
1. FEELING NERVOUS, ANXIOUS, OR ON EDGE: NOT AT ALL
2. NOT BEING ABLE TO STOP OR CONTROL WORRYING: NOT AT ALL
5. BEING SO RESTLESS THAT IT IS HARD TO SIT STILL: NOT AT ALL
6. BECOMING EASILY ANNOYED OR IRRITABLE: NOT AT ALL
7. FEELING AFRAID AS IF SOMETHING AWFUL MIGHT HAPPEN: NOT AT ALL
4. TROUBLE RELAXING: NOT AT ALL
GAD7 TOTAL SCORE: 0
3. WORRYING TOO MUCH ABOUT DIFFERENT THINGS: NOT AT ALL

## 2022-02-21 ASSESSMENT — PATIENT HEALTH QUESTIONNAIRE - PHQ9: SUM OF ALL RESPONSES TO PHQ QUESTIONS 1-9: 0

## 2022-02-21 ASSESSMENT — PAIN SCALES - GENERAL: PAINLEVEL: NO PAIN (0)

## 2022-02-21 NOTE — NURSING NOTE
Chief Complaint   Patient presents with     Physical       Initial /88 (BP Location: Right arm, Patient Position: Sitting, Cuff Size: Adult Regular)   Pulse 75   Temp 97.4  F (36.3  C) (Tympanic)   Resp 16   Wt 85.3 kg (188 lb)   SpO2 97%   BMI 25.50 kg/m   Estimated body mass index is 25.5 kg/m  as calculated from the following:    Height as of 5/14/19: 1.829 m (6').    Weight as of this encounter: 85.3 kg (188 lb).  Medication Reconciliation: complete  Graciela Burdick LPN

## 2022-02-22 ASSESSMENT — ANXIETY QUESTIONNAIRES: GAD7 TOTAL SCORE: 0

## 2022-03-12 ENCOUNTER — APPOINTMENT (OUTPATIENT)
Dept: GENERAL RADIOLOGY | Facility: HOSPITAL | Age: 57
End: 2022-03-12
Attending: EMERGENCY MEDICINE
Payer: COMMERCIAL

## 2022-03-12 ENCOUNTER — TRANSFERRED RECORDS (OUTPATIENT)
Dept: HEALTH INFORMATION MANAGEMENT | Facility: OTHER | Age: 57
End: 2022-03-12

## 2022-03-12 ENCOUNTER — APPOINTMENT (OUTPATIENT)
Dept: CT IMAGING | Facility: HOSPITAL | Age: 57
End: 2022-03-12
Attending: EMERGENCY MEDICINE
Payer: COMMERCIAL

## 2022-03-12 ENCOUNTER — HOSPITAL ENCOUNTER (EMERGENCY)
Facility: HOSPITAL | Age: 57
Discharge: ANOTHER HOSPITAL | End: 2022-03-12
Attending: EMERGENCY MEDICINE | Admitting: EMERGENCY MEDICINE
Payer: COMMERCIAL

## 2022-03-12 VITALS
OXYGEN SATURATION: 96 % | RESPIRATION RATE: 20 BRPM | HEART RATE: 67 BPM | WEIGHT: 190 LBS | BODY MASS INDEX: 25.77 KG/M2 | DIASTOLIC BLOOD PRESSURE: 99 MMHG | TEMPERATURE: 98.1 F | SYSTOLIC BLOOD PRESSURE: 143 MMHG

## 2022-03-12 DIAGNOSIS — S42.022A CLOSED DISPLACED FRACTURE OF SHAFT OF LEFT CLAVICLE, INITIAL ENCOUNTER: ICD-10-CM

## 2022-03-12 DIAGNOSIS — S42.102A CLOSED FRACTURE OF LEFT SCAPULA, UNSPECIFIED PART OF SCAPULA, INITIAL ENCOUNTER: ICD-10-CM

## 2022-03-12 DIAGNOSIS — J93.9 PNEUMOTHORAX ON LEFT: ICD-10-CM
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
## 2022-03-12 DIAGNOSIS — S22.42XA CLOSED FRACTURE OF MULTIPLE RIBS OF LEFT SIDE, INITIAL ENCOUNTER: ICD-10-CM

## 2022-03-12 LAB
ALBUMIN SERPL-MCNC: 4.2 G/DL (ref 3.4–5)
ALP SERPL-CCNC: 74 U/L (ref 40–150)
ALT SERPL W P-5'-P-CCNC: 62 U/L (ref 0–70)
ANION GAP SERPL CALCULATED.3IONS-SCNC: 5 MMOL/L (ref 3–14)
AST SERPL W P-5'-P-CCNC: 35 U/L (ref 0–45)
BASOPHILS # BLD AUTO: 0 10E3/UL (ref 0–0.2)
BASOPHILS NFR BLD AUTO: 1 %
BILIRUB SERPL-MCNC: 0.7 MG/DL (ref 0.2–1.3)
BUN SERPL-MCNC: 20 MG/DL (ref 7–30)
CALCIUM SERPL-MCNC: 9.5 MG/DL (ref 8.5–10.1)
CHLORIDE BLD-SCNC: 109 MMOL/L (ref 94–109)
CO2 SERPL-SCNC: 26 MMOL/L (ref 20–32)
CREAT SERPL-MCNC: 1.04 MG/DL (ref 0.66–1.25)
EOSINOPHIL # BLD AUTO: 0.2 10E3/UL (ref 0–0.7)
EOSINOPHIL NFR BLD AUTO: 3 %
ERYTHROCYTE [DISTWIDTH] IN BLOOD BY AUTOMATED COUNT: 12.4 % (ref 10–15)
FLUAV RNA SPEC QL NAA+PROBE: NEGATIVE
FLUBV RNA RESP QL NAA+PROBE: NEGATIVE
GFR SERPL CREATININE-BSD FRML MDRD: 85 ML/MIN/1.73M2
GLUCOSE BLD-MCNC: 162 MG/DL (ref 70–99)
HCT VFR BLD AUTO: 52.2 % (ref 40–53)
HGB BLD-MCNC: 17.5 G/DL (ref 13.3–17.7)
HOLD SPECIMEN: NORMAL
IMM GRANULOCYTES # BLD: 0.1 10E3/UL
IMM GRANULOCYTES NFR BLD: 1 %
LYMPHOCYTES # BLD AUTO: 1.8 10E3/UL (ref 0.8–5.3)
LYMPHOCYTES NFR BLD AUTO: 31 %
MCH RBC QN AUTO: 31.4 PG (ref 26.5–33)
MCHC RBC AUTO-ENTMCNC: 33.5 G/DL (ref 31.5–36.5)
MCV RBC AUTO: 94 FL (ref 78–100)
MONOCYTES # BLD AUTO: 0.5 10E3/UL (ref 0–1.3)
MONOCYTES NFR BLD AUTO: 9 %
NEUTROPHILS # BLD AUTO: 3.3 10E3/UL (ref 1.6–8.3)
NEUTROPHILS NFR BLD AUTO: 55 %
NRBC # BLD AUTO: 0 10E3/UL
NRBC BLD AUTO-RTO: 0 /100
PLATELET # BLD AUTO: 278 10E3/UL (ref 150–450)
POTASSIUM BLD-SCNC: 4 MMOL/L (ref 3.4–5.3)
PROT SERPL-MCNC: 8 G/DL (ref 6.8–8.8)
RBC # BLD AUTO: 5.58 10E6/UL (ref 4.4–5.9)
RSV RNA SPEC NAA+PROBE: NEGATIVE
SARS-COV-2 RNA RESP QL NAA+PROBE: POSITIVE
SODIUM SERPL-SCNC: 140 MMOL/L (ref 133–144)
WBC # BLD AUTO: 5.9 10E3/UL (ref 4–11)

## 2022-03-12 PROCEDURE — 96365 THER/PROPH/DIAG IV INF INIT: CPT | Mod: XU

## 2022-03-12 PROCEDURE — 82435 ASSAY OF BLOOD CHLORIDE: CPT | Performed by: EMERGENCY MEDICINE

## 2022-03-12 PROCEDURE — 72125 CT NECK SPINE W/O DYE: CPT

## 2022-03-12 PROCEDURE — C9803 HOPD COVID-19 SPEC COLLECT: HCPCS

## 2022-03-12 PROCEDURE — 87637 SARSCOV2&INF A&B&RSV AMP PRB: CPT | Performed by: EMERGENCY MEDICINE

## 2022-03-12 PROCEDURE — 99292 CRITICAL CARE ADDL 30 MIN: CPT

## 2022-03-12 PROCEDURE — 72128 CT CHEST SPINE W/O DYE: CPT

## 2022-03-12 PROCEDURE — 250N000011 HC RX IP 250 OP 636: Performed by: EMERGENCY MEDICINE

## 2022-03-12 PROCEDURE — 99291 CRITICAL CARE FIRST HOUR: CPT | Mod: 25

## 2022-03-12 PROCEDURE — 73030 X-RAY EXAM OF SHOULDER: CPT | Mod: LT

## 2022-03-12 PROCEDURE — 74177 CT ABD & PELVIS W/CONTRAST: CPT

## 2022-03-12 PROCEDURE — 70450 CT HEAD/BRAIN W/O DYE: CPT

## 2022-03-12 PROCEDURE — 85025 COMPLETE CBC W/AUTO DIFF WBC: CPT | Performed by: EMERGENCY MEDICINE

## 2022-03-12 PROCEDURE — 93005 ELECTROCARDIOGRAM TRACING: CPT

## 2022-03-12 PROCEDURE — 258N000003 HC RX IP 258 OP 636: Performed by: EMERGENCY MEDICINE

## 2022-03-12 PROCEDURE — 96375 TX/PRO/DX INJ NEW DRUG ADDON: CPT | Mod: XU

## 2022-03-12 PROCEDURE — 96376 TX/PRO/DX INJ SAME DRUG ADON: CPT | Mod: XU

## 2022-03-12 PROCEDURE — 93010 ELECTROCARDIOGRAM REPORT: CPT | Performed by: INTERNAL MEDICINE

## 2022-03-12 PROCEDURE — 36415 COLL VENOUS BLD VENIPUNCTURE: CPT | Performed by: EMERGENCY MEDICINE

## 2022-03-12 PROCEDURE — 99285 EMERGENCY DEPT VISIT HI MDM: CPT | Performed by: EMERGENCY MEDICINE

## 2022-03-12 PROCEDURE — 71045 X-RAY EXAM CHEST 1 VIEW: CPT

## 2022-03-12 RX ORDER — IOPAMIDOL 755 MG/ML
115 INJECTION, SOLUTION INTRAVASCULAR ONCE
Status: COMPLETED | OUTPATIENT
Start: 2022-03-12 | End: 2022-03-12

## 2022-03-12 RX ORDER — ACETAMINOPHEN 10 MG/ML
1000 INJECTION, SOLUTION INTRAVENOUS ONCE
Status: COMPLETED | OUTPATIENT
Start: 2022-03-12 | End: 2022-03-12

## 2022-03-12 RX ORDER — SODIUM CHLORIDE, SODIUM LACTATE, POTASSIUM CHLORIDE, CALCIUM CHLORIDE 600; 310; 30; 20 MG/100ML; MG/100ML; MG/100ML; MG/100ML
INJECTION, SOLUTION INTRAVENOUS CONTINUOUS
Status: DISCONTINUED | OUTPATIENT
Start: 2022-03-12 | End: 2022-03-12 | Stop reason: HOSPADM

## 2022-03-12 RX ORDER — MORPHINE SULFATE 4 MG/ML
2 INJECTION, SOLUTION INTRAMUSCULAR; INTRAVENOUS ONCE
Status: COMPLETED | OUTPATIENT
Start: 2022-03-12 | End: 2022-03-12

## 2022-03-12 RX ORDER — MORPHINE SULFATE 4 MG/ML
4 INJECTION, SOLUTION INTRAMUSCULAR; INTRAVENOUS ONCE
Status: COMPLETED | OUTPATIENT
Start: 2022-03-12 | End: 2022-03-12

## 2022-03-12 RX ADMIN — SODIUM CHLORIDE, POTASSIUM CHLORIDE, SODIUM LACTATE AND CALCIUM CHLORIDE: 600; 310; 30; 20 INJECTION, SOLUTION INTRAVENOUS at 12:25

## 2022-03-12 RX ADMIN — MORPHINE SULFATE 4 MG: 4 INJECTION, SOLUTION INTRAMUSCULAR; INTRAVENOUS at 10:30

## 2022-03-12 RX ADMIN — IOPAMIDOL 100 ML: 755 INJECTION, SOLUTION INTRAVENOUS at 11:29

## 2022-03-12 RX ADMIN — MORPHINE SULFATE 2 MG: 4 INJECTION, SOLUTION INTRAMUSCULAR; INTRAVENOUS at 12:26

## 2022-03-12 RX ADMIN — ACETAMINOPHEN 1000 MG: 10 INJECTION, SOLUTION INTRAVENOUS at 11:16
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
## 2022-03-12 NOTE — ED NOTES
Patient status continues to remain unchanged. Patient's breathing remains improved. O2 on at 1-1.5L for comfort. Breath sounds still clear and WNL and GCS has been 15 during entire visit.

## 2022-03-12 NOTE — ED NOTES
DATE:  3/12/2022   TIME OF RECEIPT FROM LAB:  1320  LAB TEST:  covid  LAB VALUE:  positive  RESULTS GIVEN WITH READ-BACK TO Courtney  TIME LAB VALUE REPORTED TO PROVIDER:   3059
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
## 2022-03-12 NOTE — ED NOTES
"Pt rates pain 5/10 at this time. States left rib, back and left scapula area with aching/stabbing \"a lot better.\" Pt on 1.5lpm NC. CMS in left hand, radial and ulnar pulse present.  "

## 2022-03-12 NOTE — ED NOTES
[facility] called and stated there will be a delay in picking up pt for transfer due to another call. Pt updated.
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
## 2022-03-22 PROBLEM — S42.002D CLOSED NONDISPLACED FRACTURE OF LEFT CLAVICLE WITH ROUTINE HEALING, UNSPECIFIED PART OF CLAVICLE, SUBSEQUENT ENCOUNTER: Status: ACTIVE | Noted: 2022-03-22
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
## 2022-03-22 PROBLEM — S27.0XXA CLOSED TRAUMATIC FRACTURE OF RIBS OF LEFT SIDE WITH PNEUMOTHORAX: Status: ACTIVE | Noted: 2022-03-22

## 2022-03-22 PROBLEM — S22.42XD CLOSED FRACTURE OF MULTIPLE RIBS OF LEFT SIDE WITH ROUTINE HEALING, SUBSEQUENT ENCOUNTER: Status: ACTIVE | Noted: 2022-03-22

## 2022-03-22 PROBLEM — S22.42XA CLOSED TRAUMATIC FRACTURE OF RIBS OF LEFT SIDE WITH PNEUMOTHORAX: Status: ACTIVE | Noted: 2022-03-22

## 2022-03-22 PROBLEM — S42.102D: Status: ACTIVE | Noted: 2022-03-22
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
## 2022-03-23 ENCOUNTER — LAB (OUTPATIENT)
Dept: LAB | Facility: OTHER | Age: 57
End: 2022-03-23
Attending: FAMILY MEDICINE
Payer: COMMERCIAL

## 2022-03-23 ENCOUNTER — OFFICE VISIT (OUTPATIENT)
Dept: FAMILY MEDICINE | Facility: OTHER | Age: 57
End: 2022-03-23
Attending: FAMILY MEDICINE
Payer: COMMERCIAL

## 2022-03-23 ENCOUNTER — ANCILLARY PROCEDURE (OUTPATIENT)
Dept: GENERAL RADIOLOGY | Facility: OTHER | Age: 57
End: 2022-03-23
Attending: FAMILY MEDICINE
Payer: COMMERCIAL

## 2022-03-23 VITALS
HEART RATE: 71 BPM | TEMPERATURE: 96.8 F | SYSTOLIC BLOOD PRESSURE: 145 MMHG | WEIGHT: 196 LBS | DIASTOLIC BLOOD PRESSURE: 100 MMHG | RESPIRATION RATE: 18 BRPM | OXYGEN SATURATION: 95 % | BODY MASS INDEX: 26.58 KG/M2

## 2022-03-23 DIAGNOSIS — S22.42XA CLOSED TRAUMATIC FRACTURE OF RIBS OF LEFT SIDE WITH PNEUMOTHORAX: ICD-10-CM

## 2022-03-23 DIAGNOSIS — S42.102D CLOSED FRACTURE OF LEFT SCAPULA WITH ROUTINE HEALING, UNSPECIFIED PART OF SCAPULA, SUBSEQUENT ENCOUNTER: ICD-10-CM

## 2022-03-23 DIAGNOSIS — S22.42XD CLOSED FRACTURE OF MULTIPLE RIBS OF LEFT SIDE WITH ROUTINE HEALING, SUBSEQUENT ENCOUNTER: ICD-10-CM

## 2022-03-23 DIAGNOSIS — S42.002D CLOSED NONDISPLACED FRACTURE OF LEFT CLAVICLE WITH ROUTINE HEALING, UNSPECIFIED PART OF CLAVICLE, SUBSEQUENT ENCOUNTER: ICD-10-CM

## 2022-03-23 DIAGNOSIS — S27.0XXA CLOSED TRAUMATIC FRACTURE OF RIBS OF LEFT SIDE WITH PNEUMOTHORAX: ICD-10-CM
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
## 2022-03-23 LAB
ANION GAP SERPL CALCULATED.3IONS-SCNC: 3 MMOL/L (ref 3–14)
BASOPHILS # BLD AUTO: 0.1 10E3/UL (ref 0–0.2)
BASOPHILS NFR BLD AUTO: 1 %
BUN SERPL-MCNC: 19 MG/DL (ref 7–30)
CALCIUM SERPL-MCNC: 8.8 MG/DL (ref 8.5–10.1)
CHLORIDE BLD-SCNC: 108 MMOL/L (ref 94–109)
CO2 SERPL-SCNC: 27 MMOL/L (ref 20–32)
CREAT SERPL-MCNC: 0.94 MG/DL (ref 0.66–1.25)
EOSINOPHIL # BLD AUTO: 0.7 10E3/UL (ref 0–0.7)
EOSINOPHIL NFR BLD AUTO: 8 %
ERYTHROCYTE [DISTWIDTH] IN BLOOD BY AUTOMATED COUNT: 12.6 % (ref 10–15)
GFR SERPL CREATININE-BSD FRML MDRD: >90 ML/MIN/1.73M2
GLUCOSE BLD-MCNC: 83 MG/DL (ref 70–99)
HCT VFR BLD AUTO: 44.8 % (ref 40–53)
HGB BLD-MCNC: 15.1 G/DL (ref 13.3–17.7)
IMM GRANULOCYTES # BLD: 0.1 10E3/UL
IMM GRANULOCYTES NFR BLD: 1 %
LYMPHOCYTES # BLD AUTO: 1.3 10E3/UL (ref 0.8–5.3)
LYMPHOCYTES NFR BLD AUTO: 17 %
MCH RBC QN AUTO: 31.3 PG (ref 26.5–33)
MCHC RBC AUTO-ENTMCNC: 33.7 G/DL (ref 31.5–36.5)
MCV RBC AUTO: 93 FL (ref 78–100)
MONOCYTES # BLD AUTO: 0.8 10E3/UL (ref 0–1.3)
MONOCYTES NFR BLD AUTO: 10 %
NEUTROPHILS # BLD AUTO: 5 10E3/UL (ref 1.6–8.3)
NEUTROPHILS NFR BLD AUTO: 63 %
NRBC # BLD AUTO: 0 10E3/UL
NRBC BLD AUTO-RTO: 0 /100
PLATELET # BLD AUTO: 329 10E3/UL (ref 150–450)
POTASSIUM BLD-SCNC: 4.1 MMOL/L (ref 3.4–5.3)
RBC # BLD AUTO: 4.82 10E6/UL (ref 4.4–5.9)
SODIUM SERPL-SCNC: 138 MMOL/L (ref 133–144)
WBC # BLD AUTO: 7.9 10E3/UL (ref 4–11)

## 2022-03-23 PROCEDURE — 85025 COMPLETE CBC W/AUTO DIFF WBC: CPT

## 2022-03-23 PROCEDURE — 36415 COLL VENOUS BLD VENIPUNCTURE: CPT

## 2022-03-23 PROCEDURE — 80048 BASIC METABOLIC PNL TOTAL CA: CPT

## 2022-03-23 PROCEDURE — 71046 X-RAY EXAM CHEST 2 VIEWS: CPT | Mod: TC | Performed by: STUDENT IN AN ORGANIZED HEALTH CARE EDUCATION/TRAINING PROGRAM

## 2022-03-23 PROCEDURE — 99214 OFFICE O/P EST MOD 30 MIN: CPT | Performed by: FAMILY MEDICINE

## 2022-03-23 RX ORDER — METHOCARBAMOL 750 MG/1
750 TABLET, FILM COATED ORAL
COMMUNITY
Start: 2022-03-15 | End: 2022-03-23

## 2022-03-23 RX ORDER — METHOCARBAMOL 750 MG/1
750 TABLET, FILM COATED ORAL 4 TIMES DAILY
Qty: 40 TABLET | Refills: 2 | Status: SHIPPED | OUTPATIENT
Start: 2022-03-23

## 2022-03-23 RX ORDER — OXYCODONE HYDROCHLORIDE 5 MG/1
5 TABLET ORAL EVERY 6 HOURS PRN
Qty: 20 TABLET | Refills: 0 | Status: SHIPPED | OUTPATIENT
Start: 2022-03-23 | End: 2022-03-26

## 2022-03-23 ASSESSMENT — PAIN SCALES - GENERAL: PAINLEVEL: NO PAIN (0)

## 2022-03-23 NOTE — NURSING NOTE
Chief Complaint   Patient presents with     Hospital F/U       Initial BP (!) 136/96 (BP Location: Right arm, Patient Position: Sitting, Cuff Size: Adult Large)   Pulse 71   Temp 96.8  F (36  C) (Tympanic)   Resp 18   Wt 88.9 kg (196 lb)   SpO2 95%   BMI 26.58 kg/m   Estimated body mass index is 26.58 kg/m  as calculated from the following:    Height as of 5/14/19: 1.829 m (6').    Weight as of this encounter: 88.9 kg (196 lb).  Medication Reconciliation: complete  Graciela Burdick LPN

## 2022-03-23 NOTE — NURSING NOTE
Chief Complaint   Patient presents with     Hospital F/U       Initial BP (!) 145/100 (BP Location: Left arm, Patient Position: Sitting, Cuff Size: Adult Large)   Pulse 71   Temp 96.8  F (36  C) (Tympanic)   Resp 18   Wt 88.9 kg (196 lb)   SpO2 95%   BMI 26.58 kg/m   Estimated body mass index is 26.58 kg/m  as calculated from the following:    Height as of 5/14/19: 1.829 m (6').    Weight as of this encounter: 88.9 kg (196 lb).  Medication Reconciliation: complete  Graciela Burdick LPN

## 2022-03-28 ENCOUNTER — TRANSFERRED RECORDS (OUTPATIENT)
Dept: HEALTH INFORMATION MANAGEMENT | Facility: CLINIC | Age: 57
End: 2022-03-28
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
## 2022-04-11 ENCOUNTER — TRANSFERRED RECORDS (OUTPATIENT)
Dept: HEALTH INFORMATION MANAGEMENT | Facility: CLINIC | Age: 57
End: 2022-04-11

## 2022-04-11 ENCOUNTER — ANCILLARY PROCEDURE (OUTPATIENT)
Dept: GENERAL RADIOLOGY | Facility: OTHER | Age: 57
End: 2022-04-11
Attending: FAMILY MEDICINE
Payer: COMMERCIAL

## 2022-04-11 ENCOUNTER — OFFICE VISIT (OUTPATIENT)
Dept: FAMILY MEDICINE | Facility: OTHER | Age: 57
End: 2022-04-11
Attending: FAMILY MEDICINE
Payer: COMMERCIAL

## 2022-04-11 VITALS
SYSTOLIC BLOOD PRESSURE: 126 MMHG | TEMPERATURE: 96.8 F | BODY MASS INDEX: 26.18 KG/M2 | WEIGHT: 193 LBS | OXYGEN SATURATION: 96 % | HEART RATE: 73 BPM | RESPIRATION RATE: 18 BRPM | DIASTOLIC BLOOD PRESSURE: 89 MMHG

## 2022-04-11 DIAGNOSIS — S22.42XD CLOSED FRACTURE OF MULTIPLE RIBS OF LEFT SIDE WITH ROUTINE HEALING, SUBSEQUENT ENCOUNTER: ICD-10-CM
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
## 2022-04-11 DIAGNOSIS — S42.102D CLOSED FRACTURE OF LEFT SCAPULA WITH ROUTINE HEALING, UNSPECIFIED PART OF SCAPULA, SUBSEQUENT ENCOUNTER: ICD-10-CM

## 2022-04-11 DIAGNOSIS — S42.002D CLOSED NONDISPLACED FRACTURE OF LEFT CLAVICLE WITH ROUTINE HEALING, UNSPECIFIED PART OF CLAVICLE, SUBSEQUENT ENCOUNTER: ICD-10-CM
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
## 2022-04-11 PROCEDURE — 71046 X-RAY EXAM CHEST 2 VIEWS: CPT | Mod: TC | Performed by: RADIOLOGY

## 2022-04-11 PROCEDURE — 99213 OFFICE O/P EST LOW 20 MIN: CPT | Performed by: FAMILY MEDICINE

## 2022-04-11 ASSESSMENT — PAIN SCALES - GENERAL: PAINLEVEL: NO PAIN (1)

## 2022-04-11 NOTE — NURSING NOTE
Chief Complaint   Patient presents with     RECHECK       Initial /89 (BP Location: Right arm, Patient Position: Sitting, Cuff Size: Adult Large)   Pulse 73   Temp 96.8  F (36  C) (Tympanic)   Resp 18   Wt 87.5 kg (193 lb)   SpO2 96%   BMI 26.18 kg/m   Estimated body mass index is 26.18 kg/m  as calculated from the following:    Height as of 5/14/19: 1.829 m (6').    Weight as of this encounter: 87.5 kg (193 lb).  Medication Reconciliation: complete  Graciela Burdick LPN

## 2022-05-09 ENCOUNTER — TRANSFERRED RECORDS (OUTPATIENT)
Dept: HEALTH INFORMATION MANAGEMENT | Facility: CLINIC | Age: 57
End: 2022-05-09

## 2022-06-27 ENCOUNTER — TRANSFERRED RECORDS (OUTPATIENT)
Dept: HEALTH INFORMATION MANAGEMENT | Facility: CLINIC | Age: 57
End: 2022-06-27

## 2022-08-10 DIAGNOSIS — A02.9: Primary | ICD-10-CM

## 2022-08-10 RX ORDER — CIPROFLOXACIN 500 MG/1
500 TABLET, FILM COATED ORAL 2 TIMES DAILY
Qty: 14 TABLET | Refills: 0 | Status: SHIPPED | OUTPATIENT
Start: 2022-08-10

## 2022-08-10 NOTE — PROGRESS NOTES
Suspected salmonella food poisoning.      S/s: vomiting, loose stools and fevers.        (A02.9) Salmonella food poisoning  (primary encounter diagnosis)  Comment: noted  Plan: ciprofloxacin (CIPRO) 500 MG tablet            Order sent.     Mary TRISTAN FNP-BC  Diabetes [facility] W[facility]

## 2022-09-04 ENCOUNTER — HEALTH MAINTENANCE LETTER (OUTPATIENT)
Age: 57
End: 2022-09-04

## 2022-10-04 ENCOUNTER — TRANSFERRED RECORDS (OUTPATIENT)
Dept: HEALTH INFORMATION MANAGEMENT | Facility: CLINIC | Age: 57
End: 2022-10-04

## 2023-04-29 ENCOUNTER — HEALTH MAINTENANCE LETTER (OUTPATIENT)
Age: 58
End: 2023-04-29

## 2023-12-18 DIAGNOSIS — M10.9 GOUT, UNSPECIFIED CAUSE, UNSPECIFIED CHRONICITY, UNSPECIFIED SITE: ICD-10-CM

## 2023-12-18 NOTE — TELEPHONE ENCOUNTER
[facility]      Last Written Prescription Date:  12/13/22  Last Fill Quantity: 90,   # refills: 3  Last Office Visit: 4/11/23  Future Office visit:       Routing refill request to provider for review/approval because:

## 2023-12-19 RX ORDER — FEBUXOSTAT 40 MG/1
TABLET, FILM COATED ORAL
Qty: 90 TABLET | Refills: 3 | Status: SHIPPED | OUTPATIENT
Start: 2023-12-19

## 2024-07-06 ENCOUNTER — HEALTH MAINTENANCE LETTER (OUTPATIENT)
Age: 59
End: 2024-07-06

## 2024-09-04 ENCOUNTER — OFFICE VISIT (OUTPATIENT)
Dept: CHIROPRACTIC MEDICINE | Facility: OTHER | Age: 59
End: 2024-09-04
Attending: CHIROPRACTOR
Payer: COMMERCIAL

## 2024-09-04 DIAGNOSIS — M54.50 ACUTE RIGHT-SIDED LOW BACK PAIN WITHOUT SCIATICA: ICD-10-CM

## 2024-09-04 DIAGNOSIS — M99.02 SEGMENTAL AND SOMATIC DYSFUNCTION OF THORACIC REGION: ICD-10-CM

## 2024-09-04 DIAGNOSIS — M99.03 SEGMENTAL AND SOMATIC DYSFUNCTION OF LUMBAR REGION: Primary | ICD-10-CM

## 2024-09-04 PROCEDURE — 98940 CHIROPRACT MANJ 1-2 REGIONS: CPT | Mod: AT | Performed by: CHIROPRACTOR

## 2024-09-04 PROCEDURE — 99202 OFFICE O/P NEW SF 15 MIN: CPT | Mod: 25 | Performed by: CHIROPRACTOR

## 2024-09-11 NOTE — PROGRESS NOTES
Subjective Finding:    Chief compalint: Patient presents with:  Back Pain , Pain Scale: 5/10, Intensity: sharp, Duration: 3 weeks, Radiating: right buttock.    Date of injury:     Activities that the pain restricts:   Home/household/hobbies/social activities: Yes.  Work duties: No.  Sleep: No.  Makes symptoms better: rest.  Makes symptoms worse: lumbar extension and lumbar flexion.  Have you seen anyone else for the symptoms? No.  Work related: No.  Automobile related injury: No.    Objective and Assessment:    Posture Analysis:   High shoulder: .  Head tilt: .  High iliac crest: right.  Head carriage: neutral.  Thoracic Kyphosis: neutral.  Lumbar Lordosis: forward.    Lumbar Range of Motion: extension decreased and right lateral flexion decreased.  Cervical Range of Motion: .  Thoracic Range of Motion: .  Extremity Range of Motion: .    Palpation:   Quad lumb: right, referred pain: no    Segmental dysfunction pre-treatment and treatment area: T8, L4, and L5.    Assessment post-treatment:  Cervical: .  Thoracic: ROM increased.  Lumbar: ROM increased.    Comments: .      Complicating Factors: .    Procedure(s):  CMT:  05421 Chiropractic manipulative treatment 1-2 regions performed   Thoracic: Diversified, See above for level, Prone and Lumbar: Diversified, See above for level, Side posture    Modalities:  None performed this visit    Therapeutic procedures:  None    Plan:  Treatment plan: PRN.  Instructed patient: stretch as instructed at visit.  Short term goals: reduce pain.  Long term goals: increase ADL.  Prognosis: very good.

## 2024-10-10 ENCOUNTER — ANCILLARY PROCEDURE (OUTPATIENT)
Dept: GENERAL RADIOLOGY | Facility: OTHER | Age: 59
End: 2024-10-10
Attending: FAMILY MEDICINE
Payer: COMMERCIAL

## 2024-10-10 ENCOUNTER — LAB (OUTPATIENT)
Dept: LAB | Facility: OTHER | Age: 59
End: 2024-10-10
Attending: FAMILY MEDICINE
Payer: COMMERCIAL

## 2024-10-10 ENCOUNTER — OFFICE VISIT (OUTPATIENT)
Dept: FAMILY MEDICINE | Facility: OTHER | Age: 59
End: 2024-10-10
Attending: FAMILY MEDICINE
Payer: COMMERCIAL

## 2024-10-10 VITALS
SYSTOLIC BLOOD PRESSURE: 138 MMHG | OXYGEN SATURATION: 95 % | WEIGHT: 197.9 LBS | BODY MASS INDEX: 26.81 KG/M2 | HEART RATE: 66 BPM | RESPIRATION RATE: 16 BRPM | DIASTOLIC BLOOD PRESSURE: 96 MMHG | HEIGHT: 72 IN | TEMPERATURE: 96.8 F

## 2024-10-10 DIAGNOSIS — G89.29 CHRONIC PAIN OF LEFT KNEE: ICD-10-CM

## 2024-10-10 DIAGNOSIS — E78.2 MIXED HYPERLIPIDEMIA: ICD-10-CM

## 2024-10-10 DIAGNOSIS — Z00.00 ANNUAL PHYSICAL EXAM: ICD-10-CM

## 2024-10-10 DIAGNOSIS — R03.0 ELEVATED BLOOD PRESSURE READING WITHOUT DIAGNOSIS OF HYPERTENSION: ICD-10-CM

## 2024-10-10 DIAGNOSIS — M25.562 CHRONIC PAIN OF LEFT KNEE: ICD-10-CM

## 2024-10-10 DIAGNOSIS — M17.0 PRIMARY OSTEOARTHRITIS OF BOTH KNEES: ICD-10-CM

## 2024-10-10 DIAGNOSIS — M10.9 ACUTE GOUTY ARTHRITIS: ICD-10-CM

## 2024-10-10 DIAGNOSIS — Z80.42 FH: PROSTATE CANCER: ICD-10-CM

## 2024-10-10 DIAGNOSIS — Z12.11 SPECIAL SCREENING FOR MALIGNANT NEOPLASMS, COLON: ICD-10-CM

## 2024-10-10 DIAGNOSIS — Z71.85 IMMUNIZATION COUNSELING: ICD-10-CM

## 2024-10-10 DIAGNOSIS — Z80.0 FH: COLON CANCER: ICD-10-CM

## 2024-10-10 DIAGNOSIS — Z00.00 ANNUAL PHYSICAL EXAM: Primary | ICD-10-CM

## 2024-10-10 DIAGNOSIS — R35.1 NOCTURIA: ICD-10-CM

## 2024-10-10 DIAGNOSIS — R73.9 ELEVATED BLOOD SUGAR: ICD-10-CM

## 2024-10-10 DIAGNOSIS — R73.03 PREDIABETES: ICD-10-CM

## 2024-10-10 LAB
ALBUMIN SERPL BCG-MCNC: 4.3 G/DL (ref 3.5–5.2)
ALP SERPL-CCNC: 68 U/L (ref 40–150)
ALT SERPL W P-5'-P-CCNC: 25 U/L (ref 0–70)
ANION GAP SERPL CALCULATED.3IONS-SCNC: 12 MMOL/L (ref 7–15)
AST SERPL W P-5'-P-CCNC: 18 U/L (ref 0–45)
BASOPHILS # BLD AUTO: 0.1 10E3/UL (ref 0–0.2)
BASOPHILS NFR BLD AUTO: 1 %
BILIRUB SERPL-MCNC: 0.8 MG/DL
BUN SERPL-MCNC: 16.9 MG/DL (ref 6–20)
CALCIUM SERPL-MCNC: 9.1 MG/DL (ref 8.8–10.4)
CHLORIDE SERPL-SCNC: 106 MMOL/L (ref 98–107)
CHOLEST SERPL-MCNC: 214 MG/DL
CREAT SERPL-MCNC: 1.13 MG/DL (ref 0.67–1.17)
EGFRCR SERPLBLD CKD-EPI 2021: 76 ML/MIN/1.73M2
EOSINOPHIL # BLD AUTO: 0.2 10E3/UL (ref 0–0.7)
EOSINOPHIL NFR BLD AUTO: 4 %
ERYTHROCYTE [DISTWIDTH] IN BLOOD BY AUTOMATED COUNT: 12.6 % (ref 10–15)
EST. AVERAGE GLUCOSE BLD GHB EST-MCNC: 120 MG/DL
FASTING STATUS PATIENT QL REPORTED: NO
FASTING STATUS PATIENT QL REPORTED: NO
GLUCOSE SERPL-MCNC: 124 MG/DL (ref 70–99)
HBA1C MFR BLD: 5.8 %
HCO3 SERPL-SCNC: 22 MMOL/L (ref 22–29)
HCT VFR BLD AUTO: 46.7 % (ref 40–53)
HDLC SERPL-MCNC: 52 MG/DL
HGB BLD-MCNC: 15.7 G/DL (ref 13.3–17.7)
IMM GRANULOCYTES # BLD: 0 10E3/UL
IMM GRANULOCYTES NFR BLD: 1 %
LDLC SERPL CALC-MCNC: 140 MG/DL
LYMPHOCYTES # BLD AUTO: 1.3 10E3/UL (ref 0.8–5.3)
LYMPHOCYTES NFR BLD AUTO: 25 %
MCH RBC QN AUTO: 30.6 PG (ref 26.5–33)
MCHC RBC AUTO-ENTMCNC: 33.6 G/DL (ref 31.5–36.5)
MCV RBC AUTO: 91 FL (ref 78–100)
MONOCYTES # BLD AUTO: 0.6 10E3/UL (ref 0–1.3)
MONOCYTES NFR BLD AUTO: 11 %
NEUTROPHILS # BLD AUTO: 3.1 10E3/UL (ref 1.6–8.3)
NEUTROPHILS NFR BLD AUTO: 58 %
NONHDLC SERPL-MCNC: 162 MG/DL
NRBC # BLD AUTO: 0 10E3/UL
NRBC BLD AUTO-RTO: 0 /100
PLATELET # BLD AUTO: 248 10E3/UL (ref 150–450)
POTASSIUM SERPL-SCNC: 4 MMOL/L (ref 3.4–5.3)
PROT SERPL-MCNC: 6.9 G/DL (ref 6.4–8.3)
PSA SERPL DL<=0.01 NG/ML-MCNC: 0.45 NG/ML (ref 0–3.5)
RBC # BLD AUTO: 5.13 10E6/UL (ref 4.4–5.9)
SODIUM SERPL-SCNC: 140 MMOL/L (ref 135–145)
TRIGL SERPL-MCNC: 111 MG/DL
TSH SERPL DL<=0.005 MIU/L-ACNC: 3.41 UIU/ML (ref 0.3–4.2)
WBC # BLD AUTO: 5.3 10E3/UL (ref 4–11)

## 2024-10-10 PROCEDURE — 99213 OFFICE O/P EST LOW 20 MIN: CPT | Mod: 25 | Performed by: FAMILY MEDICINE

## 2024-10-10 PROCEDURE — 83036 HEMOGLOBIN GLYCOSYLATED A1C: CPT | Performed by: FAMILY MEDICINE

## 2024-10-10 PROCEDURE — 36415 COLL VENOUS BLD VENIPUNCTURE: CPT

## 2024-10-10 PROCEDURE — 80061 LIPID PANEL: CPT

## 2024-10-10 PROCEDURE — 73560 X-RAY EXAM OF KNEE 1 OR 2: CPT | Mod: TC | Performed by: RADIOLOGY

## 2024-10-10 PROCEDURE — 99396 PREV VISIT EST AGE 40-64: CPT | Performed by: FAMILY MEDICINE

## 2024-10-10 PROCEDURE — 84153 ASSAY OF PSA TOTAL: CPT

## 2024-10-10 PROCEDURE — 80050 GENERAL HEALTH PANEL: CPT

## 2024-10-10 SDOH — HEALTH STABILITY: PHYSICAL HEALTH: ON AVERAGE, HOW MANY DAYS PER WEEK DO YOU ENGAGE IN MODERATE TO STRENUOUS EXERCISE (LIKE A BRISK WALK)?: 2 DAYS

## 2024-10-10 SDOH — HEALTH STABILITY: PHYSICAL HEALTH: ON AVERAGE, HOW MANY MINUTES DO YOU ENGAGE IN EXERCISE AT THIS LEVEL?: 30 MIN

## 2024-10-10 ASSESSMENT — SOCIAL DETERMINANTS OF HEALTH (SDOH): HOW OFTEN DO YOU GET TOGETHER WITH FRIENDS OR RELATIVES?: ONCE A WEEK

## 2024-10-10 ASSESSMENT — PAIN SCALES - GENERAL: PAINLEVEL: NO PAIN (0)
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
## 2024-10-14 ENCOUNTER — TELEPHONE (OUTPATIENT)
Dept: FAMILY MEDICINE | Facility: OTHER | Age: 59
End: 2024-10-14

## 2024-10-14 NOTE — TELEPHONE ENCOUNTER
Screening Questions for the Scheduling of Screening Colonoscopies     (If Colonoscopy is diagnostic, Provider should review the chart before scheduling.)    Are you younger than 50 or older than 80?  No    Do you take aspirin or fish oil?  Yes:  (if yes, tell patient to stop 1 week prior to Colonoscopy)    Do you take warfarin (Coumadin), clopidogrel (Plavix), apixaban (Eliquis), dabigatram (Pradaxa), rivaroxaban (Xarelto) or any blood thinner? No    Do you use oxygen at home?  No    Do you have kidney disease? No    Are you on dialysis? No    Have you had a stroke or heart attack in the last year? No    Have you had a stent in your heart or any blood vessel in the last year? No    Have you had a transplant of any organ?  No    Have you had a colonoscopy or upper endoscopy (EGD) before?  YES. Date-.         Date of scheduled Colonoscopy: 1/13/25    Provider: Lyssa Zuluaga [facility]

## 2024-10-14 NOTE — LETTER
November 14, 2024      Patrick Hamlin  [address] [address] [id number]        Dear Patrick,       Guide to Your Colonoscopy or Upper GI Endoscopy  Date of Procedure 1/13/25  Dr. Omalley    Pre-Admission Phone Interview  Date & Time: 1/6/25    Prep Instructions for Standard [facility] have been included.      At [facility], we want to make sure that your endoscopy   is as pleasant as possible. This guide is designed to answer   questions you might have and to walk you through what   you will need to do to prepare for your procedure.  Contact us if you need to cancel your procedure or have any   additional questions.  Day Surgery Reception [phone] Open M-F, 7:00 AM-5:00 PM  After Hours [phone], Ask for House Supervisor  For Cancellations - Appointments [phone]     Sincerely,        Sabino Beckford MD

## 2024-11-14 NOTE — TELEPHONE ENCOUNTER
Patient scheduled for screening colonoscopy on 1/13/25  with Dr. Lyssa Nickerson bowel preparation and script  sent to [facility] .   Patient does not need a pre-op. Guide to your Colonoscopy or Upper GI Endoscopy and prep instructions sent to patient via [place] Mail.

## 2025-01-06 ENCOUNTER — TELEPHONE (OUTPATIENT)
Dept: SURGERY | Facility: OTHER | Age: 60
End: 2025-01-06

## 2025-01-06 NOTE — CONFIDENTIAL NOTE
January 6, 2025    Patient would like to reschedule colonoscopy from 1/13/25 with Dr Omalley. Please return call when able    -Melanie Lal on 1/6/2025 at 8:27 AM

## 2025-03-07 ENCOUNTER — APPOINTMENT (OUTPATIENT)
Dept: ULTRASOUND IMAGING | Facility: HOSPITAL | Age: 60
End: 2025-03-07
Attending: NURSE PRACTITIONER
Payer: COMMERCIAL

## 2025-03-07 ENCOUNTER — APPOINTMENT (OUTPATIENT)
Dept: CT IMAGING | Facility: HOSPITAL | Age: 60
End: 2025-03-07
Attending: NURSE PRACTITIONER
Payer: COMMERCIAL

## 2025-03-07 ENCOUNTER — HOSPITAL ENCOUNTER (EMERGENCY)
Facility: HOSPITAL | Age: 60
Discharge: HOME | End: 2025-03-07
Attending: NURSE PRACTITIONER | Admitting: NURSE PRACTITIONER
Payer: COMMERCIAL

## 2025-03-07 VITALS
BODY MASS INDEX: 26.45 KG/M2 | WEIGHT: 195 LBS | DIASTOLIC BLOOD PRESSURE: 102 MMHG | TEMPERATURE: 97.5 F | SYSTOLIC BLOOD PRESSURE: 132 MMHG | RESPIRATION RATE: 18 BRPM | HEART RATE: 71 BPM | OXYGEN SATURATION: 95 %

## 2025-03-07 DIAGNOSIS — K57.32 SIGMOID DIVERTICULITIS: ICD-10-CM

## 2025-03-07 LAB
ALBUMIN SERPL BCG-MCNC: 4.3 G/DL (ref 3.5–5.2)
ALP SERPL-CCNC: 85 U/L (ref 40–150)
ALT SERPL W P-5'-P-CCNC: 23 U/L (ref 0–70)
ANION GAP SERPL CALCULATED.3IONS-SCNC: 16 MMOL/L (ref 7–15)
AST SERPL W P-5'-P-CCNC: 22 U/L (ref 0–45)
BILIRUB SERPL-MCNC: 2.2 MG/DL
BUN SERPL-MCNC: 13.6 MG/DL (ref 6–20)
CALCIUM SERPL-MCNC: 9.2 MG/DL (ref 8.8–10.4)
CHLORIDE SERPL-SCNC: 101 MMOL/L (ref 98–107)
CREAT SERPL-MCNC: 1.29 MG/DL (ref 0.67–1.17)
EGFRCR SERPLBLD CKD-EPI 2021: 65 ML/MIN/1.73M2
ERYTHROCYTE [DISTWIDTH] IN BLOOD BY AUTOMATED COUNT: 12.8 % (ref 10–15)
GLUCOSE SERPL-MCNC: 134 MG/DL (ref 70–99)
HCO3 SERPL-SCNC: 18 MMOL/L (ref 22–29)
HCT VFR BLD AUTO: 49.4 % (ref 40–53)
HGB BLD-MCNC: 17 G/DL (ref 13.3–17.7)
HOLD SPECIMEN: NORMAL
LIPASE SERPL-CCNC: 50 U/L (ref 13–60)
MCH RBC QN AUTO: 31.5 PG (ref 26.5–33)
MCHC RBC AUTO-ENTMCNC: 34.4 G/DL (ref 31.5–36.5)
MCV RBC AUTO: 92 FL (ref 78–100)
PLATELET # BLD AUTO: 221 10E3/UL (ref 150–450)
POTASSIUM SERPL-SCNC: 4 MMOL/L (ref 3.4–5.3)
PROT SERPL-MCNC: 7.7 G/DL (ref 6.4–8.3)
RBC # BLD AUTO: 5.39 10E6/UL (ref 4.4–5.9)
SODIUM SERPL-SCNC: 135 MMOL/L (ref 135–145)
WBC # BLD AUTO: 15.8 10E3/UL (ref 4–11)

## 2025-03-07 PROCEDURE — 99285 EMERGENCY DEPT VISIT HI MDM: CPT | Mod: 25

## 2025-03-07 PROCEDURE — 36415 COLL VENOUS BLD VENIPUNCTURE: CPT | Performed by: NURSE PRACTITIONER

## 2025-03-07 PROCEDURE — 250N000011 HC RX IP 250 OP 636: Performed by: NURSE PRACTITIONER

## 2025-03-07 PROCEDURE — 74177 CT ABD & PELVIS W/CONTRAST: CPT

## 2025-03-07 PROCEDURE — 258N000003 HC RX IP 258 OP 636: Performed by: NURSE PRACTITIONER

## 2025-03-07 PROCEDURE — 76705 ECHO EXAM OF ABDOMEN: CPT | Mod: TC

## 2025-03-07 PROCEDURE — 76705 ECHO EXAM OF ABDOMEN: CPT | Mod: 26 | Performed by: NURSE PRACTITIONER

## 2025-03-07 PROCEDURE — 85018 HEMOGLOBIN: CPT | Performed by: NURSE PRACTITIONER

## 2025-03-07 PROCEDURE — 83690 ASSAY OF LIPASE: CPT | Performed by: NURSE PRACTITIONER

## 2025-03-07 PROCEDURE — 96360 HYDRATION IV INFUSION INIT: CPT | Mod: XU

## 2025-03-07 PROCEDURE — 85048 AUTOMATED LEUKOCYTE COUNT: CPT | Performed by: NURSE PRACTITIONER

## 2025-03-07 PROCEDURE — 99284 EMERGENCY DEPT VISIT MOD MDM: CPT | Mod: 25 | Performed by: NURSE PRACTITIONER

## 2025-03-07 PROCEDURE — 80053 COMPREHEN METABOLIC PANEL: CPT | Performed by: NURSE PRACTITIONER

## 2025-03-07 RX ORDER — IOPAMIDOL 755 MG/ML
95 INJECTION, SOLUTION INTRAVASCULAR ONCE
Status: COMPLETED | OUTPATIENT
Start: 2025-03-07 | End: 2025-03-07

## 2025-03-07 RX ORDER — METRONIDAZOLE 500 MG/1
500 TABLET ORAL 3 TIMES DAILY
Qty: 14 TABLET | Refills: 0 | Status: SHIPPED | OUTPATIENT
Start: 2025-03-07 | End: 2025-03-12

## 2025-03-07 RX ORDER — CIPROFLOXACIN 500 MG/1
500 TABLET, FILM COATED ORAL 2 TIMES DAILY
Qty: 14 TABLET | Refills: 0 | Status: SHIPPED | OUTPATIENT
Start: 2025-03-07 | End: 2025-03-12

## 2025-03-07 RX ADMIN — IOPAMIDOL 95 ML: 755 INJECTION, SOLUTION INTRAVENOUS at 19:56

## 2025-03-07 RX ADMIN — SODIUM CHLORIDE, SODIUM LACTATE, POTASSIUM CHLORIDE, AND CALCIUM CHLORIDE 1000 ML: .6; .31; .03; .02 INJECTION, SOLUTION INTRAVENOUS at 20:07

## 2025-03-07 ASSESSMENT — COLUMBIA-SUICIDE SEVERITY RATING SCALE - C-SSRS
1. IN THE PAST MONTH, HAVE YOU WISHED YOU WERE DEAD OR WISHED YOU COULD GO TO SLEEP AND NOT WAKE UP?: NO
2. HAVE YOU ACTUALLY HAD ANY THOUGHTS OF KILLING YOURSELF IN THE PAST MONTH?: NO
6. HAVE YOU EVER DONE ANYTHING, STARTED TO DO ANYTHING, OR PREPARED TO DO ANYTHING TO END YOUR LIFE?: NO

## 2025-03-07 ASSESSMENT — ENCOUNTER SYMPTOMS
CONSTIPATION: 0
CARDIOVASCULAR NEGATIVE: 1
FEVER: 1
BLOOD IN STOOL: 0
ENDOCRINE NEGATIVE: 1
DIARRHEA: 0
ABDOMINAL PAIN: 1
PSYCHIATRIC NEGATIVE: 1
NAUSEA: 1
RESPIRATORY NEGATIVE: 1
MUSCULOSKELETAL NEGATIVE: 1
HEMATOLOGIC/LYMPHATIC NEGATIVE: 1
ALLERGIC/IMMUNOLOGIC NEGATIVE: 1
NEUROLOGICAL NEGATIVE: 1
EYES NEGATIVE: 1
VOMITING: 0

## 2025-03-07 ASSESSMENT — ACTIVITIES OF DAILY LIVING (ADL)
ADLS_ACUITY_SCORE: 41

## 2025-03-08 NOTE — DISCHARGE INSTRUCTIONS
Diverticulitis Treatment:               You were diagnosed with diverticulitis.  You are able to go home and treat.  THE MOST IMPORTANT THING YOU CAN DO IS HAVE A LIQUID DIET THAT IS NO/LOW FIBER.  It is important you stay on this diet until symptoms resolve plus another 2 days after.  After symptoms resolved you can advance your diet as tolerated.  Take antibiotics as discussed below.  Take pain medications as discussed below.      Antibiotic use:   An antibiotic was ordered to help fight the bacterial infection that was acquired.  You need to take this medication exactly as prescribed.  DO NOT stop taking this medication until the prescribed end date, even if you are feeling better.  This way the affecting bacteria in your body are killed off.   You should eat probiotic yogurt (with live cultures) or Kefir (similar to yogurt) while taking antibiotic to promote regrowth of good bacteria in your digestive tract, which can be depleted by antibiotics.  Birth control and antibiotics (if applicable):   Birth control pills contain estrogens. Some antibiotics cause the enzymes in the liver to increase the break-down of estrogens and thereby can decrease the levels of estrogens in the body and the effectiveness of the birth control medications.  This can result in unwanted pregnancy.  To be safe it is wise to use a back-up-method of birth control while you take, and for 7 days after finishing the antibiotic.  Coumadin and antibiotics (if applicable):   Finally, if on coumadin, let your coumadin prescriber know. You likely need to have your INR checked by your Primary Care Provider in 2-3 days. Contact your clinic for an appointment.          Pain control:   If your past medical conditions, allergies, current medications, or current status does not prevent you from using acetaminophen and/or ibuprofen, use the following:   Acetaminophen 650-1000 mg every 6 hours as needed for pain in addition to ibuprofen 400-600 mg every 6  hours as needed for pain.  Take these two medications together if wanted.    Remember that these are for AS NEEDED.  If not needed, do not take.          Antibiotic use:   An antibiotic was ordered to help fight the bacterial infection that was acquired. You need to take this medication exactly as prescribed. DO NOT stop taking this medication until the prescribed end date, even if you are feeling better. This way the affecting bacteria in your body are killed off.   You should eat probiotic yogurt (with live cultures) or Kefir (similar to yogurt) while taking antibiotic to promote regrowth of good bacteria in your digestive tract, which can be depleted by antibiotics.   Take antibiotic as prescribed. This antibiotic can cause high and/or low blood sugar. If you are diabetic, please check your sugars frequently to avoid this from happening and contact primary if having any readings of glucose <70 or > 350.   This antibiotic can weaken tendons. Avoid strenuous activity. This can prolong your heart resting ability. If having heart palpitations or feeling your heart race, please be seen.  Birth control and antibiotics (if applicable):   Birth control pills contain estrogens. Some antibiotics cause the enzymes in the liver to increase the break-down of estrogens and thereby can decrease the levels of estrogens in the body and the effectiveness of the birth control medications. This can result in unwanted pregnancy. To be safe it is wise to use a back-up-method of birth control while you take, and for 7 days after finishing the antibiotic.   Coumadin and antibiotics (if applicable):   Finally, if on coumadin, let your coumadin prescriber know. You likely need to have your INR checked by your Primary Care Provider in 2-3 days. Contact your clinic for an appointment.           Follow-up with your primary care provider for reevaluation.  Contact your primary care provider if you have any questions or concerns.  Do not  hesitate to return to the ER if any new or worsening symptoms.     Please read the attached instructions (if any).  They highlight more specific treatments and interventions for you at home.              Thank you for letting me participate in your care and wish you a fast and uneventful recovery,    Oskar TRISTAN CNP    Do not hesitate to contact me with questions or concerns.  [email or web address]

## 2025-03-08 NOTE — ED NOTES
Patient to room 3, settled on cot and call light with in reach.    States he has had lower left abdominal pain since Wednesday but it has gotten worse as the day went on today. States he was a little nauseated yesterday but never threw up. States he did have a BM yesterday but today he feels like he has to go but can't.
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
## 2025-03-08 NOTE — ED TRIAGE NOTES
"\"Having left lower abdominal pain which started on Wednesday.  I have a fever but did not take a temp.  I took Tylenol at 1630\"        "

## 2025-03-10 ENCOUNTER — TELEPHONE (OUTPATIENT)
Dept: FAMILY MEDICINE | Facility: OTHER | Age: 60
End: 2025-03-10

## 2025-03-12 ENCOUNTER — OFFICE VISIT (OUTPATIENT)
Dept: SURGERY | Facility: OTHER | Age: 60
End: 2025-03-12
Attending: SURGERY
Payer: COMMERCIAL

## 2025-03-12 VITALS
OXYGEN SATURATION: 93 % | HEART RATE: 73 BPM | HEIGHT: 72 IN | SYSTOLIC BLOOD PRESSURE: 126 MMHG | RESPIRATION RATE: 18 BRPM | WEIGHT: 195 LBS | DIASTOLIC BLOOD PRESSURE: 84 MMHG | BODY MASS INDEX: 26.41 KG/M2

## 2025-03-12 DIAGNOSIS — K57.32 DIVERTICULITIS OF COLON: Primary | ICD-10-CM

## 2025-03-12 RX ORDER — CIPROFLOXACIN 500 MG/1
500 TABLET, FILM COATED ORAL 2 TIMES DAILY
Qty: 14 TABLET | Refills: 0 | Status: SHIPPED | OUTPATIENT
Start: 2025-03-12 | End: 2025-03-19

## 2025-03-12 RX ORDER — METRONIDAZOLE 500 MG/1
500 TABLET ORAL 3 TIMES DAILY
Qty: 21 TABLET | Refills: 0 | Status: SHIPPED | OUTPATIENT
Start: 2025-03-12 | End: 2025-03-19

## 2025-03-12 ASSESSMENT — PAIN SCALES - GENERAL: PAINLEVEL_OUTOF10: NO PAIN (0)
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
## 2025-06-06 NOTE — OR NURSING
Spoke with patient's wife; patient has had multiple pre-op calls this year and states he does not need another one.  Number given if patient has any questions and/or concerns.

## 2025-06-13 ENCOUNTER — HOSPITAL ENCOUNTER (OUTPATIENT)
Facility: HOSPITAL | Age: 60
Discharge: HOME | End: 2025-06-13
Attending: SURGERY | Admitting: SURGERY
Payer: COMMERCIAL

## 2025-06-13 VITALS
RESPIRATION RATE: 16 BRPM | SYSTOLIC BLOOD PRESSURE: 123 MMHG | HEART RATE: 67 BPM | OXYGEN SATURATION: 94 % | DIASTOLIC BLOOD PRESSURE: 90 MMHG

## 2025-06-13 PROCEDURE — 360N000075 HC SURGERY LEVEL 2, PER MIN: Performed by: SURGERY

## 2025-06-13 PROCEDURE — G0105 COLORECTAL SCRN; HI RISK IND: HCPCS | Performed by: SURGERY

## 2025-06-13 PROCEDURE — 258N000003 HC RX IP 258 OP 636: Performed by: NURSE PRACTITIONER

## 2025-06-13 PROCEDURE — 370N000017 HC ANESTHESIA TECHNICAL FEE, PER MIN: Performed by: SURGERY

## 2025-06-13 PROCEDURE — 710N000012 HC RECOVERY PHASE 2, PER MINUTE: Performed by: SURGERY

## 2025-06-13 PROCEDURE — 999N000141 HC STATISTIC PRE-PROCEDURE NURSING ASSESSMENT: Performed by: SURGERY

## 2025-06-13 PROCEDURE — 272N000001 HC OR GENERAL SUPPLY STERILE: Performed by: SURGERY

## 2025-06-13 RX ORDER — ONDANSETRON 2 MG/ML
4 INJECTION INTRAMUSCULAR; INTRAVENOUS EVERY 30 MIN PRN
Status: DISCONTINUED | OUTPATIENT
Start: 2025-06-13 | End: 2025-06-13 | Stop reason: HOSPADM

## 2025-06-13 RX ORDER — DEXAMETHASONE SODIUM PHOSPHATE 10 MG/ML
4 INJECTION, SOLUTION INTRAMUSCULAR; INTRAVENOUS
Status: DISCONTINUED | OUTPATIENT
Start: 2025-06-13 | End: 2025-06-13 | Stop reason: HOSPADM

## 2025-06-13 RX ORDER — SODIUM CHLORIDE, SODIUM LACTATE, POTASSIUM CHLORIDE, CALCIUM CHLORIDE 600; 310; 30; 20 MG/100ML; MG/100ML; MG/100ML; MG/100ML
INJECTION, SOLUTION INTRAVENOUS CONTINUOUS
Status: DISCONTINUED | OUTPATIENT
Start: 2025-06-13 | End: 2025-06-13 | Stop reason: HOSPADM

## 2025-06-13 RX ORDER — NALOXONE HYDROCHLORIDE 0.4 MG/ML
0.1 INJECTION, SOLUTION INTRAMUSCULAR; INTRAVENOUS; SUBCUTANEOUS
Status: DISCONTINUED | OUTPATIENT
Start: 2025-06-13 | End: 2025-06-13 | Stop reason: HOSPADM

## 2025-06-13 RX ORDER — ONDANSETRON 4 MG/1
4 TABLET, ORALLY DISINTEGRATING ORAL EVERY 30 MIN PRN
Status: DISCONTINUED | OUTPATIENT
Start: 2025-06-13 | End: 2025-06-13 | Stop reason: HOSPADM

## 2025-06-13 RX ORDER — LIDOCAINE 40 MG/G
CREAM TOPICAL
Status: DISCONTINUED | OUTPATIENT
Start: 2025-06-13 | End: 2025-06-13 | Stop reason: HOSPADM

## 2025-06-13 RX ADMIN — SODIUM CHLORIDE, SODIUM LACTATE, POTASSIUM CHLORIDE, AND CALCIUM CHLORIDE: .6; .31; .03; .02 INJECTION, SOLUTION INTRAVENOUS at 09:01

## 2025-06-13 ASSESSMENT — ACTIVITIES OF DAILY LIVING (ADL)
ADLS_ACUITY_SCORE: 41

## 2025-06-13 NOTE — OP NOTE
Patrick Hamlin MRN# [id number]   YOB: 1965 Age: 59 year old      Date of Admission:  6/13/2025  Date of Service:   6/13/25    Primary care provider: Rakan Fuentes    PREOPERATIVE DIAGNOSIS:  Colon Cancer Screening        POSTOPERATIVE DIAGNOSIS:  Mild sigmoid diverticulosis          PROCEDURE:  Colonoscopy           INDICATIONS:  Screening colonoscopy.      Specimen: * No specimens in log *    SURGEON: Lucas Canada MD    DESCRIPTION OF PROCEDURE: Patrick Hamlin was brought into the endoscopy suite and placed in the left lateral decubitus position. After preprocedural pause and attended monitored anesthesia was administered, the external anus was inspected and was normal. Digital rectal exam was normal. The colonoscope was inserted and advanced under direct visualization to the level of the cecum which was identified by the appendiceal orifice and the ileocecal valve. The prep was adequate. Upon slow withdrawal of the colonoscope, approximately 95% of the mucosa was directly visualized. There were a few diverticulum noted in sigmoid without inflammation. The rest of the colon was without mucosal abnormality. There was no evidence of further polyps, inflammation, bleeding or AVMs. Retroflexion of the rectum was normal. The extra air was removed from the colon, and the colonoscope withdrawn. The patient tolerated the procedure well and was taken to postanesthesia care unit.     We invite the patient to return in 5 years for follow up screening evaluation, due to family history.    Lucas Canada MD

## 2025-06-13 NOTE — DISCHARGE INSTRUCTIONS
We welcome you to return in 5 years for follow up screening evaluation, due to family history.     Thank you for allowing [facility] to care for you today.  If you have any questions and/or concerns please reach out to us Monday-Friday 0800-4:00 PM at [phone].  After hours please go to the Urgent Care and/or Emergency Room.  If you feel like it is an emergency call 911.
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
## (undated) DEVICE — IRRIGATION-H2O 1000ML

## (undated) DEVICE — CONNECTOR ERBEFLO 2 PORT 20325-215

## (undated) DEVICE — CONNECTOR-ERBEFLO 2 PORT

## (undated) DEVICE — TUBING-SUCTION 20FT

## (undated) DEVICE — SOL WATER IRRIG 1000ML BOTTLE 2F7114

## (undated) DEVICE — SUCTION MANIFOLD NEPTUNE 2 SYS 1 PORT 702-025-000

## (undated) DEVICE — TUBING SUCTION 20FT N620A

## (undated) DEVICE — SENSOR-OXISENSOR II ADULT

## (undated) RX ORDER — PROPOFOL 10 MG/ML
INJECTION, EMULSION INTRAVENOUS
Status: DISPENSED
Start: 2019-02-08

## (undated) RX ORDER — PROPOFOL 10 MG/ML
INJECTION, EMULSION INTRAVENOUS
Status: DISPENSED
Start: 2025-06-13